# Patient Record
Sex: MALE | Race: WHITE | NOT HISPANIC OR LATINO | Employment: PART TIME | URBAN - METROPOLITAN AREA
[De-identification: names, ages, dates, MRNs, and addresses within clinical notes are randomized per-mention and may not be internally consistent; named-entity substitution may affect disease eponyms.]

---

## 2017-02-06 ENCOUNTER — RECORDS - HEALTHEAST (OUTPATIENT)
Dept: GENERAL RADIOLOGY | Facility: CLINIC | Age: 47
End: 2017-02-06

## 2017-02-06 ENCOUNTER — OFFICE VISIT - HEALTHEAST (OUTPATIENT)
Dept: FAMILY MEDICINE | Facility: CLINIC | Age: 47
End: 2017-02-06

## 2017-02-06 DIAGNOSIS — M79.642 PAIN IN LEFT HAND: ICD-10-CM

## 2017-02-06 DIAGNOSIS — M79.642 LEFT HAND PAIN: ICD-10-CM

## 2018-01-08 ENCOUNTER — OFFICE VISIT - HEALTHEAST (OUTPATIENT)
Dept: FAMILY MEDICINE | Facility: CLINIC | Age: 48
End: 2018-01-08

## 2018-01-08 DIAGNOSIS — I10 HYPERTENSION: ICD-10-CM

## 2018-01-08 DIAGNOSIS — L03.90 CELLULITIS: ICD-10-CM

## 2018-01-08 ASSESSMENT — MIFFLIN-ST. JEOR: SCORE: 2051.94

## 2018-03-15 ENCOUNTER — OFFICE VISIT - HEALTHEAST (OUTPATIENT)
Dept: FAMILY MEDICINE | Facility: CLINIC | Age: 48
End: 2018-03-15

## 2018-03-15 DIAGNOSIS — L03.90 CELLULITIS: ICD-10-CM

## 2018-03-15 ASSESSMENT — MIFFLIN-ST. JEOR: SCORE: 2074.06

## 2018-03-18 ENCOUNTER — COMMUNICATION - HEALTHEAST (OUTPATIENT)
Dept: SCHEDULING | Facility: CLINIC | Age: 48
End: 2018-03-18

## 2018-03-18 DIAGNOSIS — L03.90 CELLULITIS: ICD-10-CM

## 2018-03-27 ENCOUNTER — OFFICE VISIT - HEALTHEAST (OUTPATIENT)
Dept: FAMILY MEDICINE | Facility: CLINIC | Age: 48
End: 2018-03-27

## 2018-03-27 DIAGNOSIS — L03.211 CELLULITIS OF FACE: ICD-10-CM

## 2018-03-27 DIAGNOSIS — L03.90 CELLULITIS: ICD-10-CM

## 2018-03-27 ASSESSMENT — MIFFLIN-ST. JEOR: SCORE: 2059.88

## 2018-04-09 ENCOUNTER — RECORDS - HEALTHEAST (OUTPATIENT)
Dept: ADMINISTRATIVE | Facility: OTHER | Age: 48
End: 2018-04-09

## 2019-03-25 ENCOUNTER — HOSPITAL ENCOUNTER (OUTPATIENT)
Dept: CT IMAGING | Facility: CLINIC | Age: 49
Discharge: HOME | End: 2019-03-25
Attending: FAMILY MEDICINE

## 2019-03-25 ENCOUNTER — COMMUNICATION - HEALTHEAST (OUTPATIENT)
Dept: FAMILY MEDICINE | Facility: CLINIC | Age: 49
End: 2019-03-25

## 2019-03-25 ENCOUNTER — OFFICE VISIT - HEALTHEAST (OUTPATIENT)
Dept: FAMILY MEDICINE | Facility: CLINIC | Age: 49
End: 2019-03-25

## 2019-03-25 DIAGNOSIS — R10.31 RLQ ABDOMINAL PAIN: ICD-10-CM

## 2019-03-25 ASSESSMENT — MIFFLIN-ST. JEOR: SCORE: 2049.67

## 2019-04-08 ENCOUNTER — OFFICE VISIT - HEALTHEAST (OUTPATIENT)
Dept: FAMILY MEDICINE | Facility: CLINIC | Age: 49
End: 2019-04-08

## 2019-04-08 DIAGNOSIS — K62.5 BRBPR (BRIGHT RED BLOOD PER RECTUM): ICD-10-CM

## 2019-04-08 LAB
ERYTHROCYTE [DISTWIDTH] IN BLOOD BY AUTOMATED COUNT: 11.7 % (ref 11–14.5)
HCT VFR BLD AUTO: 45.2 % (ref 40–54)
HGB BLD-MCNC: 15.3 G/DL (ref 14–18)
MCH RBC QN AUTO: 32.7 PG (ref 27–34)
MCHC RBC AUTO-ENTMCNC: 33.8 G/DL (ref 32–36)
MCV RBC AUTO: 97 FL (ref 80–100)
PLATELET # BLD AUTO: 231 THOU/UL (ref 140–440)
PMV BLD AUTO: 9.2 FL (ref 7–10)
RBC # BLD AUTO: 4.68 MILL/UL (ref 4.4–6.2)
WBC: 6.8 THOU/UL (ref 4–11)

## 2019-04-12 ENCOUNTER — RECORDS - HEALTHEAST (OUTPATIENT)
Dept: ADMINISTRATIVE | Facility: OTHER | Age: 49
End: 2019-04-12

## 2019-12-12 ENCOUNTER — RECORDS - HEALTHEAST (OUTPATIENT)
Dept: GENERAL RADIOLOGY | Facility: CLINIC | Age: 49
End: 2019-12-12

## 2019-12-12 ENCOUNTER — OFFICE VISIT - HEALTHEAST (OUTPATIENT)
Dept: FAMILY MEDICINE | Facility: CLINIC | Age: 49
End: 2019-12-12

## 2019-12-12 DIAGNOSIS — R50.9 FEVER, UNSPECIFIED: ICD-10-CM

## 2019-12-12 DIAGNOSIS — R50.9 FEVER: ICD-10-CM

## 2019-12-12 DIAGNOSIS — R05.9 COUGH: ICD-10-CM

## 2019-12-12 DIAGNOSIS — J20.9 ACUTE BRONCHITIS, UNSPECIFIED ORGANISM: ICD-10-CM

## 2019-12-12 LAB
FLUAV AG SPEC QL IA: NORMAL
FLUBV AG SPEC QL IA: NORMAL

## 2020-01-29 ENCOUNTER — OFFICE VISIT - HEALTHEAST (OUTPATIENT)
Dept: FAMILY MEDICINE | Facility: CLINIC | Age: 50
End: 2020-01-29

## 2020-01-29 ENCOUNTER — RECORDS - HEALTHEAST (OUTPATIENT)
Dept: GENERAL RADIOLOGY | Facility: CLINIC | Age: 50
End: 2020-01-29

## 2020-01-29 DIAGNOSIS — R68.89 FLU-LIKE SYMPTOMS: ICD-10-CM

## 2020-01-29 DIAGNOSIS — R05.9 COUGH: ICD-10-CM

## 2020-01-29 DIAGNOSIS — J10.1 INFLUENZA B: ICD-10-CM

## 2020-01-29 LAB
FLUAV AG SPEC QL IA: ABNORMAL
FLUBV AG SPEC QL IA: ABNORMAL

## 2020-03-24 ENCOUNTER — VIRTUAL VISIT (OUTPATIENT)
Dept: FAMILY MEDICINE | Facility: OTHER | Age: 50
End: 2020-03-24

## 2020-03-24 NOTE — PROGRESS NOTES
"Date: 2020 18:25:50  Clinician: Macy Marks  Clinician NPI: [id number]  Patient: Ben Zarate  Patient : 1970  Patient Address: [address] [place] [address]  Patient Phone: [phone]  Visit Protocol: URI  Patient Summary:  Ben is a 49 year old ( : 1970 ) male who initiated a Visit for cold, sinus infection, or influenza. When asked the question \"Please sign me up to receive news, health information and promotions from [employer].\", Ben responded \"Yes\".    Ben states his symptoms started 1-2 days ago.   His symptoms consist of myalgia, a cough, malaise, chills, and a headache. He is experiencing mild difficulty breathing with activities but can speak normally in full sentences. Ben also feels feverish.   Symptom details     Cough: Ben coughs every 5-10 minutes and his cough is not more bothersome at night. Phlegm does not come into his throat when he coughs. He does not believe his cough is caused by post-nasal drip.     Temperature: His current temperature is 99.9 degrees Fahrenheit.     Headache: He states the headache is mild (1-3 on a 10 point pain scale).      Ben denies having ear pain, rhinitis, enlarged lymph nodes, facial pain or pressure, wheezing, sore throat, nasal congestion, and teeth pain. He also denies having a sinus infection within the past year, taking antibiotic medication for the symptoms, and having recent facial or sinus surgery in the past 60 days.   Precipitating events  He has not recently been exposed to someone with influenza. Ben has not been in close contact with any high risk individuals.   Pertinent COVID-19 (Coronavirus) information  Ben has not traveled internationally or to the areas where COVID-19 (Coronavirus) is widespread, including cruise ship travel in the last 14 days before the start of his symptoms.   Ben has not had a close contact with a laboratory-confirmed COVID-19 patient within 14 days of symptom onset. He also has not " had a close contact with a suspected COVID-19 patient within 14 days of symptom onset.   Ben is not a healthcare worker and does not work in a healthcare facility.   Pertinent medical history  Ben does not need a return to work/school note.   Weight: 270 lbs   Ben does not smoke or use smokeless tobacco.   Weight: 270 lbs    MEDICATIONS: omeprazole oral, ALLERGIES: NKDA  Clinician Response:  Dear Ben,  I am sorry you are not feeling well. Your health is our priority. Based on the information you have provided, it is possible that you may have some type of viral infection.  Please read the full treatment plan and see my recommendations below.  Medication information  Because you have a viral infection, antibiotics will not help you get better. Treating a viral infection with antibiotics could actually make you feel worse.  Unless you are allergic to the over-the-counter medication(s) below, I recommend using:     Acetaminophen (Tylenol or store brand) oral tablet. Take 1-2 tablets by mouth every 4-6 hours to help with the discomfort.   Over-the-counter medications do not require a prescription. Ask the pharmacist if you have any questions.  Self care  Steps you can take to be as comfortable as possible:     Rest.    Drink plenty of water and other liquids.    Take a spoonful of honey to reduce your cough.     Additional treatment plan   Based on the information you have provided, you do have symptoms that are consistent with Coronavirus (COVID-19).  The coronavirus causes mild to severe respiratory illness with the most common symptoms including fever, cough and difficulty breathing. Unfortunately, many viruses cause similar symptoms and it can be difficult to distinguish between viruses, especially in mild cases, so we are presuming that anyone with cough or fever has coronavirus at this time.  Coronavirus/COVID-19 has reached the point of community spread in [place], meaning that we are finding the virus in  people with no known exposure risk for mylene the virus. Given the increasing commonness of coronavirus in the community we are no longer testing patients who are not critically ill.  If you are a health care worker, you should refer to your employee health office for instructions about testing and returning to work.  For everyone else who has cough or fever, you should assume you are infected with coronavirus. Since you will not be tested but have symptoms that may be consistent with coronavirus, the CDC recommends you stay in self-isolation until these three things have happened:    You have had no fever for at least 72 hours (that is three full days of no fever without the use of medicine that reduces fevers)    AND   Other symptoms have improved (for example, when your cough or shortness of breath have improved)   AND   At least 7 days have passed since your symptoms first appeared.   How to Isolate:   Isolate yourself at home.  Do Not allow any visitors  Do Not go to work or school  Do Not go to [employer],  centers, shopping, or other public places.  Do Not shake hands.  Avoid close contact with others (hugging, kissing).   Protect Others:   Cover Your Mouth and Nose with a mask, disposable tissue or wash cloth to avoid spreading germs to others.  Wash your hands and face frequently with soap and water.   We know it can be scary to hear that you might have COVID-19. Our team can help track your symptoms and make sure you are doing ok over the next two weeks using a program called [employer] to keep in touch. When you receive an email from [employer], please consider enrolling in our monitoring program. There is no cost to you for monitoring. Here is a URL where you can learn more: [email or web address]  Managing Symptoms:   At this time, we primarily recommend Tylenol (Acetaminophen) for fever or pain. If you have liver or kidney problems, contact your primary care provider for  instructions on use of tylenol. Adults can take 650 mg (two 325 mg pills) by mouth every 4-6 hours as needed OR 1,000 mg (two 500 mg pills) every 8 hours as needed. MAXIMUM DAILY DOSE: 3,000mg. For children, refer to dosing on bottle based on age or weight.   If you develop significant shortness of breath that prevents you from doing normal activities, please call 911 or proceed to the nearest emergency room and alert them immediately that you have been in self-isolation for possible coronavirus.  For more information about COVID19 and options for caring for yourself at home, please visit the CDC website at https://www.cdc.gov/coronavirus/2019-ncov/about/steps-when-sick.htmlFor more options for care at [facility], please visit our website at [email or web address]    Diagnosis: Coronavirus infection, unspecified  Diagnosis ICD: B34.2

## 2020-03-27 ENCOUNTER — COMMUNICATION - HEALTHEAST (OUTPATIENT)
Dept: SCHEDULING | Facility: CLINIC | Age: 50
End: 2020-03-27

## 2020-03-27 ENCOUNTER — OFFICE VISIT - HEALTHEAST (OUTPATIENT)
Dept: FAMILY MEDICINE | Facility: CLINIC | Age: 50
End: 2020-03-27

## 2020-03-27 ENCOUNTER — VIRTUAL VISIT (OUTPATIENT)
Dept: FAMILY MEDICINE | Facility: OTHER | Age: 50
End: 2020-03-27

## 2020-03-27 DIAGNOSIS — R05.9 COUGH: ICD-10-CM

## 2020-03-27 DIAGNOSIS — Z20.822 SUSPECTED COVID-19 VIRUS INFECTION: ICD-10-CM

## 2020-03-27 NOTE — PROGRESS NOTES
"Date: 2020 08:47:04  Clinician: Dolly Moreno  Clinician NPI: [id number]  Patient: eBn Zarate  Patient : 1970  Patient Address: [address] [place] [address]  Patient Phone: [phone]  Visit Protocol: URI  Patient Summary:  Ben is a 49 year old ( : 1970 ) male who initiated a Visit for COVID-19 (Coronavirus) evaluation and screening. When asked the question \"Please sign me up to receive news, health information and promotions from [employer].\", Ben responded \"No\".    Ben states his symptoms started suddenly 3-6 days ago. After his symptoms started, they improved and then got worse again.   His symptoms consist of a cough, malaise, a headache, and myalgia.   Symptom details     Cough: Ben coughs every 5-10 minutes and his cough is not more bothersome at night. Phlegm does not come into his throat when he coughs. He does not believe his cough is caused by post-nasal drip.     Headache: He states the headache is mild (1-3 on a 10 point pain scale).      Ben denies having ear pain, rhinitis, wheezing, sore throat, nasal congestion, enlarged lymph nodes, teeth pain, fever, facial pain or pressure, and chills. He also denies taking antibiotic medication for the symptoms, having recent facial or sinus surgery in the past 60 days, and having a sinus infection within the past year.   Precipitating events  He has not recently been exposed to someone with influenza. Ben has not been in close contact with any high risk individuals.   Pertinent COVID-19 (Coronavirus) information  Ben has not traveled internationally or to the areas where COVID-19 (Coronavirus) is widespread, including cruise ship travel in the last 14 days before the start of his symptoms.   Ben has not had a close contact with a laboratory-confirmed COVID-19 patient within 14 days of symptom onset. He also has not had a close contact with a suspected COVID-19 patient within 14 days of symptom onset.   Ben is not a " healthcare worker or a  and does not work in a healthcare facility. He does not live with a healthcare worker.   Triage Point(s) temporarily suspended for COVID-19 (Coronavirus) screening  Ben reported the following symptoms which were previously protocol referral points. These protocol referral points have temporarily been removed for purposes of COVID-19 (Coronavirus) screening.   Difficulty breathing even when resting and can only speak in phrase(s)   Pertinent medical history  Ben does not need a return to work/school note.   Weight: 270 lbs   Ben does not smoke or use smokeless tobacco.   Additional information as reported by the patient (free text): I can speak in full sentences, but am having trouble breathing even while resting. It is getting worse each day. That wasn't an option to pick.   Weight: 270 lbs    MEDICATIONS: omeprazole oral, ALLERGIES: NKDA  Clinician Response:  Dear Ben,   Based on the information you have provided, you do have symptoms that are consistent with Coronavirus (COVID-19).  The coronavirus causes mild to severe respiratory illness with the most common symptoms including fever, cough and difficulty breathing. Unfortunately, many viruses cause similar symptoms and it can be difficult to distinguish between viruses, especially in mild cases, so we are presuming that anyone with cough or fever has coronavirus at this time.  Coronavirus/COVID-19 has reached the point of community spread in [place], meaning that we are finding the virus in people with no known exposure risk for mylene the virus. Given the increasing commonness of coronavirus in the community we are no longer testing patients who are not critically ill.  If you are a health care worker, you should refer to your employee health office for instructions about testing and returning to work.  For everyone else who has cough or fever, you should assume you are infected with coronavirus. Since you will  not be tested but have symptoms that may be consistent with coronavirus, the CDC recommends you stay in self-isolation until these three things have happened:    You have had no fever for at least 72 hours (that is three full days of no fever without the use of medicine that reduces fevers)    AND   Other symptoms have improved (for example, when your cough or shortness of breath have improved)   AND   At least 7 days have passed since your symptoms first appeared.   How to Isolate:   Isolate yourself at home.  Do Not allow any visitors  Do Not go to work or school  Do Not go to [employer]'s witness,  centers, shopping, or other public places.  Do Not shake hands.  Avoid close contact with others (hugging, kissing).   Protect Others:   Cover Your Mouth and Nose with a mask, disposable tissue or wash cloth to avoid spreading germs to others.  Wash your hands and face frequently with soap and water.   We know it can be scary to hear that you might have COVID-19. Our team can help track your symptoms and make sure you are doing ok over the next two weeks using a program called [employer] to keep in touch. When you receive an email from [employer], please consider enrolling in our monitoring program. There is no cost to you for monitoring. Here is a URL where you can learn more: [email or web address]  Managing Symptoms:   At this time, we primarily recommend Tylenol (Acetaminophen) for fever or pain. If you have liver or kidney problems, contact your primary care provider for instructions on use of tylenol. Adults can take 650 mg (two 325 mg pills) by mouth every 4-6 hours as needed OR 1,000 mg (two 500 mg pills) every 8 hours as needed. MAXIMUM DAILY DOSE: 3,000mg. For children, refer to dosing on bottle based on age or weight.   If you develop significant shortness of breath that prevents you from doing normal activities, please call 911 or proceed to the nearest emergency room and alert them immediately that  you have been in self-isolation for possible coronavirus.  If you have a higher risk medical condition such as cancer, heart failure, end stage renal disease on dialysis or have a transplant, please reach out to your specialist's clinic to advise them of your [facility] visit should you not improve within the next two days.   For more information about COVID19 and options for caring for yourself at home, please visit the CDC website at https://www.cdc.gov/coronavirus/2019-ncov/about/steps-when-sick.htmlFor more options for care at [facility], please visit our website at [email or web address]    Diagnosis: Cough  Diagnosis ICD: R05

## 2020-04-13 ENCOUNTER — COMMUNICATION - HEALTHEAST (OUTPATIENT)
Dept: FAMILY MEDICINE | Facility: CLINIC | Age: 50
End: 2020-04-13

## 2020-04-13 DIAGNOSIS — R05.9 COUGH: ICD-10-CM

## 2020-07-12 DIAGNOSIS — Z11.59 SCREENING FOR VIRAL DISEASE: ICD-10-CM

## 2020-11-05 ENCOUNTER — VIRTUAL VISIT (OUTPATIENT)
Dept: FAMILY MEDICINE | Facility: OTHER | Age: 50
End: 2020-11-05

## 2020-11-05 NOTE — PROGRESS NOTES
"Date: 2020 08:34:08  Clinician: John Sky  Clinician NPI: [id number]  Patient: Ben Zarate  Patient : 1970  Patient Address: [address] [place] [address]  Patient Phone: [phone]  Visit Protocol: URI  Patient Summary:  Ben is a 50 year old ( : 1970 ) male who initiated a [facility] Visit for COVID-19 (Coronavirus) evaluation and screening. When asked the question \"Please sign me up to receive news, health information and promotions. \", Ben responded \"No\".    Ben states his symptoms started 1-2 days ago.   His symptoms consist of rhinitis, myalgia, chills, malaise, a sore throat, a headache, and a cough. Ben also feels feverish.   Symptom details     Nasal secretions: The color of his mucus is clear.    Cough: Ben coughs a few times an hour and his cough is not more bothersome at night. Phlegm does not come into his throat when he coughs. He believes his cough is caused by post-nasal drip.     Sore throat: Ben reports having mild throat pain (1-3 on a 10 point pain scale), does not have exudate on his tonsils, and can swallow liquids. The lymph nodes in his neck are not enlarged. A rash has not appeared on the skin since the sore throat started.     Temperature: His current temperature is 101.0 degrees Fahrenheit. Ben has had a temperature over 100 degrees Fahrenheit for 1-2 days.     Headache: He states the headache is mild (1-3 on a 10 point pain scale).      Ben denies having vomiting, facial pain or pressure, teeth pain, ageusia, diarrhea, ear pain, wheezing, enlarged lymph nodes, nasal congestion, nausea, and anosmia. He also denies taking antibiotic medication in the past month, having recent facial or sinus surgery in the past 60 days, and having a sinus infection within the past year. He is not experiencing dyspnea.   Precipitating events  Within the past week, Ben has not been exposed to someone with strep throat. He has not recently been exposed to someone " with influenza. Ben has not been in close contact with any high risk individuals.   Pertinent COVID-19 (Coronavirus) information  Ben does not work or volunteer as healthcare worker or a . In the past 14 days, Ben has not worked or volunteered at a healthcare facility or group living setting.   In the past 14 days, he also has not lived in a congregate living setting.   Ben has not had a close contact with a laboratory-confirmed COVID-19 patient within 14 days of symptom onset.    Since December 2019, Ben has not been tested for COVID-19 and has not had upper respiratory infection or influenza-like illness.   Pertinent medical history  Ben needs a return to work/school note.   Weight: 275 lbs   Ben does not smoke or use smokeless tobacco.   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 I have to have a negative test result to go back to work   Weight: 275 lbs    MEDICATIONS: omeprazole oral, ALLERGIES: NKDA  Clinician Response:  Dear Ben,   Your symptoms show that you may have coronavirus (COVID-19). This illness can cause fever, cough and trouble breathing. Many people get a mild case and get better on their own. Some people can get very sick.  What should I do?  We would like to test you for this virus.   1. Please call [phone] to schedule your visit. Explain that [facility]. Be ready to share your [facility] visit ID number.  * If you need to schedule in [facility] please call [phone] or for [place] employees please call [phone].  * If you need to schedule in the [place] area please call [phone]. [place] employees call [phone].  The following will serve as your written order for this COVID Test, ordered by me, for the indication of suspected COVID [Z20.828]: The test will be ordered in [employer], our electronic health record, after you are scheduled. It will show as ordered and  "authorized by Surya Healy MD.  Order: COVID-19 (Coronavirus) PCR for SYMPTOMATIC testing from [facility].   2. When it's time for your COVID test:  Stay at least 6 feet away from others. (If someone will drive you to your test, stay in the backseat, as far away from the  as you can.)   Cover your mouth and nose with a mask, tissue or washcloth.  Go straight to the testing site. Don't make any stops on the way there or back.      3.Starting now: Stay home and away from others (self-isolate) until:   You've had no fever---and no medicine that reduces fever---for one full day (24 hours). And...   Your other symptoms have gotten better. For example, your cough or breathing has improved. And...   At least 10 days have passed since your symptoms started.       During this time, don't leave the house except for testing or medical care.   Stay in your own room, even for meals. Use your own bathroom if you can.   Stay away from others in your home. No hugging, kissing or shaking hands. No visitors.  Don't go to work, school or anywhere else.    Clean \"high touch\" surfaces often (doorknobs, counters, handles, etc.). Use a household cleaning spray or wipes. You'll find a full list of  on the EPA website: www.epa.gov/pesticide-registration/list-n-disinfectants-use-against-sars-cov-2.   Cover your mouth and nose with a mask, tissue or washcloth to avoid spreading germs.  Wash your hands and face often. Use soap and water.  Caregivers in these groups are at risk for severe illness due to COVID-19:  o People 65 years and older  o People who live in a nursing home or long-term care facility  o People with chronic disease (lung, heart, cancer, diabetes, kidney, liver, immunologic)  o People who have a weakened immune system, including those who:   Are in cancer treatment  Take medicine that weakens the immune system, such as corticosteroids  Had a bone marrow or organ transplant  Have an immune deficiency  Have poorly " controlled HIV or AIDS  Are obese (body mass index of 40 or higher)  Smoke regularly   o Caregivers should wear gloves while washing dishes, handling laundry and cleaning bedrooms and bathrooms.  o Use caution when washing and drying laundry: Don't shake dirty laundry, and use the warmest water setting that you can.  o For more tips, go to www.cdc.gov/coronavirus/2019-ncov/downloads/10Things.pdf.    How can I take care of myself?    Get lots of rest. Drink extra fluids (unless a doctor has told you not to).   Take Tylenol (acetaminophen) for fever or pain. If you have liver or kidney problems, ask your family doctor if it's okay to take Tylenol.   Adults can take either:    650 mg (two 325 mg pills) every 4 to 6 hours, or...   1,000 mg (two 500 mg pills) every 8 hours as needed.    Note: Don't take more than 3,000 mg in one day. Acetaminophen is found in many medicines (both prescribed and over-the-counter medicines). Read all labels to be sure you don't take too much.   For children, check the Tylenol bottle for the right dose. The dose is based on the child's age or weight.    If you have other health problems (like cancer, heart failure, an organ transplant or severe kidney disease): Call your specialty clinic if you don't feel better in the next 2 days.       Know when to call 911. Emergency warning signs include:    Trouble breathing or shortness of breath Pain or pressure in the chest that doesn't go away Feeling confused like you haven't felt before, or not being able to wake up Bluish-colored lips or face.  Where can I get more information?    [employer] -- About COVID-19: [email or web address]   CDC -- What to Do If You're Sick: www.cdc.gov/coronavirus/2019-ncov/about/steps-when-sick.html   CDC -- Ending Home Isolation: www.cdc.gov/coronavirus/2019-ncov/hcp/disposition-in-home-patients.html   CDC -- Caring for Someone: www.cdc.gov/coronavirus/2019-ncov/if-you-are-sick/care-for-someone.html    [facility] -- Interim Guidance for Hospital Discharge to Home: www.health.[facility].[place]/diseases/coronavirus/hcp/hospdischarge.pdf   [facility] clinical trials (COVID-19 research studies): clinicalaffairs.[facility] [place]/n-clinical-trials    Below are the COVID-19 hotlines at the [place] Department of Health ([facility]). Interpreters are available.    For health questions: Call 123-023-2738 or 1-796.400.9708 (7 a.m. to 7 p.m.) For questions about schools and childcare: Call 854-030-7122 or 1-697.860.7389 (7 a.m. to 7 p.m.)    Diagnosis: Contact with and (suspected) exposure to other viral communicable diseases  Diagnosis ICD: Z20.828

## 2020-11-08 ENCOUNTER — AMBULATORY - HEALTHEAST (OUTPATIENT)
Dept: FAMILY MEDICINE | Facility: CLINIC | Age: 50
End: 2020-11-08

## 2020-11-08 DIAGNOSIS — Z20.822 SUSPECTED COVID-19 VIRUS INFECTION: ICD-10-CM

## 2020-11-09 ENCOUNTER — AMBULATORY - HEALTHEAST (OUTPATIENT)
Dept: FAMILY MEDICINE | Facility: CLINIC | Age: 50
End: 2020-11-09

## 2020-11-09 DIAGNOSIS — Z20.822 SUSPECTED COVID-19 VIRUS INFECTION: ICD-10-CM

## 2021-05-27 NOTE — TELEPHONE ENCOUNTER
Test Results  Who is calling?:Patient    Who ordered the test:  PCP  Type of test: Imaging CT Abd pelvic    Date of test: 3/25/19  Where was the test performed:  [facility]  What are your questions/concerns?:  Please call when results are available  No final at this time.   Okay to leave a detailed message?:  No

## 2021-05-27 NOTE — PROGRESS NOTES
"Chief Complaint   Patient presents with     Abdominal Pain     cramping, nausea vomiting, diarrhea  comes and goes since last monday, no fever,        HPI: 48-year-old male presents today with one-week history of abdominal cramps, lower abdominal pain, occasional diarrhea and one episode of emesis.  He notes that last Monday he started having abdominal cramps, and on Tuesday he has had diarrhea.  He had one episode of emesis but mostly intermittent diarrhea.  There is been no melena or hematochezia.  Been taking Imodium for decreased diarrhea.  No fever or chills noted.    ROS: No fever chills melena hematochezia    SH:    reports that  has never smoked. he has never used smokeless tobacco. He reports that he drinks alcohol. He reports that he does not use drugs.      FH: The Patient's family history is not on file.     Meds:  Ben has a current medication list which includes the following prescription(s): omeprazole, acetaminophen, hydrochlorothiazide, and ibuprofen.    O:  /82   Pulse 94   Temp 98.7  F (37.1  C)   Ht 5' 10\" (1.778 m)   Wt (!) 262 lb (118.8 kg)   SpO2 96%   BMI 37.59 kg/m       Lungs--Clear to Auscultation  Heart--Regular rate and rhythm  Abdomen--mildly tender with increased pain in the right lower quadrant  Skin-Pink and dry     A/P:   1. RLQ abdominal pain  The patient has right lower quadrant pain with diarrhea for the past week.  Most likely a viral cause however cannot rule out other causes of abdominal pain including appendicitis, Meckel's diverticulum and colitis.  Will check laboratory, and sent immediately over to [facility] for CT scan.  - HM2(CBC w/o Differential)  - Comprehensive Metabolic Panel  - CT Chest Abdomen Pelvis With Oral With IV Cont; Future                                          "

## 2021-05-27 NOTE — PROGRESS NOTES
Chief Complaint   Patient presents with     Diarrhea     Pt c/o RLQ cramping this morning. Blood in diarrhea this moring.        HPI: Ben was seen urgently today as a walk-in due to lower abdominal pain.  He has had cramping and lower abdominal pain since 630 this morning.  This is accompanied by one episode of relatively dark blood.  The cramping is intermittent, in the lower portion of the abdomen, and not associated with fever chills nausea vomiting.  Mild diarrhea was noted.    Review of old records from 3/25/2019 shows CT scan of the abdomen had no pathology.    ROS: No fever chills vomiting.    SH:    reports that he has never smoked. He has never used smokeless tobacco. He reports that he drinks alcohol. He reports that he does not use drugs.      FH: The Patient's family history is not on file.     Meds:  Ben has a current medication list which includes the following prescription(s): acetaminophen, ibuprofen, omeprazole, and hydrochlorothiazide.    O:  /90   Pulse 97   Temp 98.3  F (36.8  C)   Resp 18   Wt (!) 266 lb 5 oz (120.8 kg)   SpO2 98%   BMI 38.21 kg/m    No acute distress  Lungs--Clear to Auscultation  Heart--Regular rate and rhythm  Abdomen--Soft, non-tender, non-distended  Skin-Pink and dry     A/P:   1. BRBPR (bright red blood per rectum)  The patient has one episode of blood per rectum with abdominal cramping.  This is in the context of having had this 2 weeks ago.  We will have him see GI promptly in the next 1 or 2 days, and recheck hemoglobin  - Ambulatory referral for Colonoscopy    2.  Abdominal pain  -Encourage clear liquid diet for 24 hours, and stay home from work today.  Note was given for work.

## 2021-05-30 VITALS — BODY MASS INDEX: 36.66 KG/M2 | WEIGHT: 255.5 LBS

## 2021-05-31 VITALS — HEIGHT: 70 IN | BODY MASS INDEX: 37.58 KG/M2 | WEIGHT: 262.5 LBS

## 2021-06-01 VITALS — BODY MASS INDEX: 37.83 KG/M2 | HEIGHT: 70 IN | WEIGHT: 264.25 LBS

## 2021-06-01 VITALS — BODY MASS INDEX: 38.28 KG/M2 | WEIGHT: 267.38 LBS | HEIGHT: 70 IN

## 2021-06-02 VITALS — WEIGHT: 266.31 LBS | BODY MASS INDEX: 38.21 KG/M2

## 2021-06-02 VITALS — WEIGHT: 262 LBS | BODY MASS INDEX: 37.51 KG/M2 | HEIGHT: 70 IN

## 2021-06-04 VITALS
SYSTOLIC BLOOD PRESSURE: 133 MMHG | WEIGHT: 269 LBS | BODY MASS INDEX: 38.6 KG/M2 | OXYGEN SATURATION: 97 % | DIASTOLIC BLOOD PRESSURE: 89 MMHG | TEMPERATURE: 98.3 F | RESPIRATION RATE: 16 BRPM | HEART RATE: 83 BPM

## 2021-06-04 VITALS
BODY MASS INDEX: 38.98 KG/M2 | OXYGEN SATURATION: 98 % | WEIGHT: 271.7 LBS | RESPIRATION RATE: 26 BRPM | TEMPERATURE: 98.9 F | SYSTOLIC BLOOD PRESSURE: 136 MMHG | DIASTOLIC BLOOD PRESSURE: 80 MMHG | HEART RATE: 110 BPM

## 2021-06-04 NOTE — PROGRESS NOTES
Assessment:     1. Acute bronchitis, unspecified organism     2. Cough  Influenza A/B Rapid Test- Nasal Swab    XR Chest 2 Views    benzonatate (TESSALON) 200 MG capsule   3. Fever  Influenza A/B Rapid Test- Nasal Swab    XR Chest 2 Views          Plan:     Symptoms consistent with acute viral bronchitis.  No antibiotics indicated at this time.  Chest x-ray ordered and reviewed by myself showing no evidence of infiltrate.  Previously seen granuloma is unchanged.  Negative for influenza.  Prescription given for Tessalon Perles to take symptomatically for his cough.  Discussed the typical course of illness.  Recommend following up in 1 week if developing high fevers or significantly worsening shortness of breath or other cough symptoms.  Patient is agreeable with this plan.    Subjective:       49 y.o. male presents for evaluation of cough that seems to be getting worse.  2 weeks ago he started having some nasal congestion and cough as well as a low-grade fever.  The symptoms seem to be improving after about a week but over the past 4 to 5 days has had body aches, chills, and worsening cough productive of yellowish-brown sputum.  He denies much nasal congestion and has not really had a sore throat or fever.  He has used guaifenesin cough syrup for his symptoms which has seemed to be mildly helpful.  He denies tobacco use or vaping.  No history of asthma.  He has not been short of breath or wheezy.    Patient Active Problem List   Diagnosis     Morbid obesity (H)     Essential hypertension       Past Medical History:   Diagnosis Date     GERD (gastroesophageal reflux disease)      Hiatal hernia        Past Surgical History:   Procedure Laterality Date     HERNIA REPAIR         Current Outpatient Medications on File Prior to Visit   Medication Sig Dispense Refill     ibuprofen (ADVIL,MOTRIN) 200 MG tablet Take 200 mg by mouth every 6 (six) hours as needed for pain.       MULTIVITAMIN ORAL Take 1 tablet by mouth daily.        omeprazole (PRILOSEC) 20 MG capsule Take 20 mg by mouth daily.       acetaminophen (TYLENOL) 500 MG tablet Take 1 tablet (500 mg total) by mouth every 6 (six) hours as needed.  0     hydroCHLOROthiazide (MICROZIDE) 12.5 mg capsule Take 2 capsules (25 mg total) by mouth daily. 90 capsule 0     No current facility-administered medications on file prior to visit.        No Known Allergies    No family history on file.    Social History     Socioeconomic History     Marital status:      Spouse name: None     Number of children: None     Years of education: None     Highest education level: None   Occupational History     None   Social Needs     Financial resource strain: None     Food insecurity:     Worry: None     Inability: None     Transportation needs:     Medical: None     Non-medical: None   Tobacco Use     Smoking status: Never Smoker     Smokeless tobacco: Never Used     Tobacco comment: no vaping   Substance and Sexual Activity     Alcohol use: Yes     Comment: rare     Drug use: No     Sexual activity: None   Lifestyle     Physical activity:     Days per week: None     Minutes per session: None     Stress: None   Relationships     Social connections:     Talks on phone: None     Gets together: None     Attends [employer] service: None     Active member of club or organization: None     Attends meetings of clubs or organizations: None     Relationship status: None     Intimate partner violence:     Fear of current or ex partner: None     Emotionally abused: None     Physically abused: None     Forced sexual activity: None   Other Topics Concern     None   Social History Narrative     None         Review of Systems  A 12 point comprehensive review of systems was negative except as noted.      Objective:      /80 (Patient Site: Right Arm, Patient Position: Sitting, Cuff Size: Adult Regular)   Pulse (!) 110   Temp 98.9  F (37.2  C) (Oral)   Resp 26   Wt (!) 271 lb 11.2 oz (123.2 kg)   SpO2  98%   BMI 38.98 kg/m      General Appearance:    Alert, mildly ill-appearing but in no distress.   Head:    Normocephalic, without obvious abnormality, atraumatic   Eyes:    Conjunctiva/corneas clear   Ears:    Normal TM's without erythema or bulging. Jennifer external ear canals, both ears   Nose:   Nares normal, septum midline, mucosa normal, no drainage    or sinus tenderness   Throat:   Lips, mucosa, and tongue normal; teeth and gums normal.  No tonsilar hypertrophy or exudate.   Neck:   Supple, symmetrical, trachea midline, no adenopathy    Lungs:    A few rhonchi heard in the right lower lung field.  No wheezing.  Overall good aeration.    Heart:    Regular rate and rhythm, S1 and S2 normal, no murmur, rub   or gallop       Extremities:   Extremities normal, atraumatic, no cyanosis or edema   Skin:   Skin color, texture, turgor normal, no rashes or lesions          Recent Results (from the past 24 hour(s))   Influenza A/B Rapid Test- Nasal Swab   Result Value Ref Range    Influenza  A, Rapid Antigen No Influenza A antigen detected No Influenza A antigen detected    Influenza B, Rapid Antigen No Influenza B antigen detected No Influenza B antigen detected         Xr Chest 2 Views    Result Date: 12/12/2019  EXAM: XR CHEST 2 VIEWS LOCATION: [facility] DATE/TIME: 12/12/2019 8:06 AM INDICATION: Cough COMPARISON: 02/14/2017 and CT 03/25/2019     Benign calcified granuloma right midlung unchanged. Lungs otherwise clear with no focal infiltrates.           This note has been dictated using voice recognition software. Any grammatical or context distortions are unintentional and inherent to the software

## 2021-06-05 NOTE — PROGRESS NOTES
Assessment:     1. Influenza B     2. Flu-like symptoms  Influenza A/B Rapid Test- Nasal Swab   3. Cough  XR Chest 2 Views          Plan:     Patient positive for influenza.  Tamiflu not advised given the duration of his symptoms already.  Chest x-ray ordered and reviewed by myself showing no evidence of infiltrate.  No antibiotics indicated at this time.  Discussed the typical course of symptoms and the length that patient will be contagious.  Recommend symptomatic care with Tylenol, ibuprofen, rest, and fluids.  Follow-up if symptoms getting worse or not improving in the expected time course of symptoms.        Subjective:       49 y.o. male presents for evaluation of a 3-day history of cough, fever, body aches, and mild headache.  At times he feels like he is wheezing but this is not been consistent.  He has not been short of breath.  Denies much nasal congestion or sore throat.  He has not had any ear pain.        Patient Active Problem List   Diagnosis     Morbid obesity (H)     Essential hypertension       Past Medical History:   Diagnosis Date     GERD (gastroesophageal reflux disease)      Hiatal hernia        Past Surgical History:   Procedure Laterality Date     HERNIA REPAIR         Current Outpatient Medications on File Prior to Visit   Medication Sig Dispense Refill     ibuprofen (ADVIL,MOTRIN) 200 MG tablet Take 200 mg by mouth every 6 (six) hours as needed for pain.       MULTIVITAMIN ORAL Take 1 tablet by mouth daily.       omeprazole (PRILOSEC) 20 MG capsule Take 20 mg by mouth daily.       acetaminophen (TYLENOL) 500 MG tablet Take 1 tablet (500 mg total) by mouth every 6 (six) hours as needed.  0     benzonatate (TESSALON) 200 MG capsule Take 1 capsule (200 mg total) by mouth 3 (three) times a day as needed for cough. 30 capsule 0     hydroCHLOROthiazide (MICROZIDE) 12.5 mg capsule Take 2 capsules (25 mg total) by mouth daily. 90 capsule 0     hyoscyamine (LEVSIN/SL) 0.125 mg SL tablet TAKE 1-2  TABS SUBLINGUALLY FOR ABDOMINAL CRAMPS UP TO THREE TIMES DAILY AS NEEDED  3     No current facility-administered medications on file prior to visit.        No Known Allergies    No family history on file.    Social History     Socioeconomic History     Marital status:      Spouse name: None     Number of children: None     Years of education: None     Highest education level: None   Occupational History     None   Social Needs     Financial resource strain: None     Food insecurity:     Worry: None     Inability: None     Transportation needs:     Medical: None     Non-medical: None   Tobacco Use     Smoking status: Never Smoker     Smokeless tobacco: Never Used     Tobacco comment: no vaping   Substance and Sexual Activity     Alcohol use: Yes     Comment: rare     Drug use: No     Sexual activity: None   Lifestyle     Physical activity:     Days per week: None     Minutes per session: None     Stress: None   Relationships     Social connections:     Talks on phone: None     Gets together: None     Attends Ep[employer] service: None     Active member of club or organization: None     Attends meetings of clubs or organizations: None     Relationship status: None     Intimate partner violence:     Fear of current or ex partner: None     Emotionally abused: None     Physically abused: None     Forced sexual activity: None   Other Topics Concern     None   Social History Narrative     None         Review of Systems  A 12 point comprehensive review of systems was negative except as noted.      Objective:                                  General Appearance:      Vitals:    01/29/20 0908   BP: 133/89   Pulse: 83   Resp: 16   Temp: 98.3  F (36.8  C)   SpO2: 97%           Alert, mildly ill-appearing but in no distress.   Head:    Normocephalic, without obvious abnormality, atraumatic   Eyes:    Conjunctiva/corneas clear   Ears:    Normal TM's without erythema or bulging. Normal external ear canals, both ears   Nose:    Nares normal, septum midline, mucosa normal, no drainage    or sinus tenderness   Throat:   Lips, mucosa, and tongue normal; teeth and gums normal.  No tonsilar hypertrophy or exudate.   Neck:   Supple, symmetrical, trachea midline, no adenopathy    Lungs:    Few scattered rhonchi heard at the left base.  Overall good aeration.  No wheezing.    Heart:    Regular rate and rhythm, S1 and S2 normal, no murmur, rub or gallop       Extremities:   Extremities normal, atraumatic, no cyanosis or edema   Skin:   Skin color, texture, turgor normal, no rashes or lesions     Chest x-ray ordered and reviewed by myself.  No evidence of infiltrate that I can appreciate.    Recent Results (from the past 24 hour(s))   Influenza A/B Rapid Test- Nasal Swab   Result Value Ref Range    Influenza  A, Rapid Antigen No Influenza A antigen detected No Influenza A antigen detected    Influenza B, Rapid Antigen Influenza B antigen detected (!) No Influenza B antigen detected         Xr Chest 2 Views    Result Date: 1/29/2020  EXAM: XR CHEST 2 VIEWS LOCATION: [facility] DATE/TIME: 1/29/2020 9:32 AM INDICATION: Cough COMPARISON: 12/12/2019     No focal consolidation or pleural effusion. A stable calcified granuloma in the right midlung. The cardiac silhouette and pulmonary vasculature are normal.             This note has been dictated using voice recognition software. Any grammatical or context distortions are unintentional and inherent to the software

## 2021-06-07 NOTE — TELEPHONE ENCOUNTER
Refill Approved    Rx renewed per Medication Renewal Policy. Medication was last renewed on 3/27/20.    Shonna Blair, [facility] Triage/Med Refill 4/13/2020     Requested Prescriptions   Pending Prescriptions Disp Refills     albuterol (PROAIR HFA;PROVENTIL HFA;VENTOLIN HFA) 90 mcg/actuation inhaler [Pharmacy Med Name: ALBUTEROL HFA (VENTOLIN) INH] 18 Inhaler 0     Sig: INHALE 2 PUFFS BY MOUTH EVERY 6 HOURS AS NEEDED FOR WHEEZE       Albuterol/Levalbuterol Refill Protocol Passed - 4/13/2020 12:24 AM        Passed - PCP or prescribing provider visit in last year     Last office visit with prescriber/PCP: Visit date not found OR same dept: Visit date not found OR same specialty: 4/8/2019 Hayley Solis MD Last physical: Visit date not found       Next appt within 3 mo: Visit date not found  Next physical within 3 mo: Visit date not found  Prescriber OR PCP: Joseph Levine CNP  Last diagnosis associated with med order: 1. Cough  - albuterol (PROAIR HFA;PROVENTIL HFA;VENTOLIN HFA) 90 mcg/actuation inhaler [Pharmacy Med Name: ALBUTEROL HFA (VENTOLIN) INH]; INHALE 2 PUFFS BY MOUTH EVERY 6 HOURS AS NEEDED FOR WHEEZE  Dispense: 18 Inhaler; Refill: 0    If protocol passes may refill for 6 months if within 3 months of last provider visit (or a total of 9 months). If patient requesting >1 inhaler per month refill x 6 months and have patient make appointment with provider.

## 2021-06-07 NOTE — TELEPHONE ENCOUNTER
Patient states he has done an E-Visit twice and was told to do a phone visit with his PCP.  Transferred to scheduling to set up.    Jenny Vargas RN  [facility] Triage Nurse Advisor

## 2021-06-07 NOTE — PROGRESS NOTES
"Ben Zarate is a 49 y.o. male who is being evaluated via a billable telephone visit.      The patient has been notified of following:     \"This telephone visit will be conducted via a call between you and your physician/provider. We have found that certain health care needs can be provided without the need for a physical exam.  This service lets us provide the care you need with a short phone conversation.  If a prescription is necessary we can send it directly to your pharmacy.  If lab work is needed we can place an order for that and you can then stop by our lab to have the test done at a later time.    If during the course of the call the physician/provider feels a telephone visit is not appropriate, you will not be charged for this service.\"     Physician has received verbal consent for a Telephone Visit from the patient? Yes    Ben Zarate complains of  No chief complaint on file.      I have reviewed and updated the patient's Past Medical History, Social History, Family History and Medication List.    ALLERGIES  Patient has no known allergies.    Additional provider notes: Patient presents today with 3 days of symptoms including body aches, fever of 100.5 which has self resolved, and a dry cough.  He notes some intermittent worsening shortness of breath.  This is not constant.  No respiratory distress.  Denies nausea and vomiting.  No constipation or diarrhea.  He did have some chills when his temperature was up, but that is now gone.  No dizziness or lightheadedness.  Non-smoker.  No specific sick contacts.  He did have influenza B in late January.  No rashes on his body.  No known sick contacts.      Assessment/Plan:  1. Cough  Discussed symptom management.  As needed albuterol, benzonatate, and cough syrup.  Discussed with the patient that should his shortness of breath worsen or become constant, he needs evaluation in the emergency department.  Should he develop chest pain, ER.    - albuterol " (PROAIR HFA;PROVENTIL HFA;VENTOLIN HFA) 90 mcg/actuation inhaler; Inhale 2 puffs every 6 (six) hours as needed for wheezing.  Dispense: 1 each; Refill: 0  - benzonatate (TESSALON) 200 MG capsule; Take 1 capsule (200 mg total) by mouth 3 (three) times a day as needed for cough.  Dispense: 30 capsule; Refill: 0  - codeine-guaiFENesin (CODEINE-GUAIFENESIN)  mg/5 mL liquid; Take 5 mL by mouth 3 (three) times a day as needed.  Dispense: 236 mL; Refill: 0    2. Suspected Covid-19 Virus Infection  Discussed with the patient that symptoms are consistent with suspected coronavirus infection.  We discussed how long to stay out of work, how long this will last for, and appropriate quarantine measures.  Unfortunately we are unable to test him to confirm given current lack of supplies.    Phone call duration:  15 minutes    Joseph Levine, CNP

## 2021-06-08 NOTE — PROGRESS NOTES
Assessment:     1. Left hand pain  XR Hand Left 3 or More VWS     I personally reviewed the xray, no fracture seen.   Xr Hand Left 3 Or More Vws    Result Date: 2/6/2017  XR HAND LEFT 3 OR MORE VWS2/6/2017 1:27 PMINDICATION: 3rd MCP joint painCOMPARISON: None.FINDINGS: Negative left hand. No fracture or dislocation. Well-corticated calcification adjacent to ulnar styloid process should represent an ossicle.This report was  electronically interpreted by: Dr. Jelani Earl MD ON 02/06/2017 at 13:34    No fracture present. Most consistent with tendonitis. No evidence of arthritis on xray and not consistent on hx.     Plan:   Left hand pain at 3rd MCP joint  -Rest, limit activities that are painful or irritating  -Apply ice 2- 3 times daily for 15 -20 min  -May take Ibuprofen 400mg three times daily with food for 7-10 days and then as needed for pain.  -Follow up with primary care if symptoms are not improving for re-evaluation        Subjective:       Ben Zarate is a 46 y.o. male who presents for evaluation of left hand injury for about 2 -3 wks.  This started at the end of 3 games of bowling and he started to have pain in 3rd finger MCP joint after hyperextending it. He rates the pain currently as 5/10 with movement and has limited ability to grasp. Symptoms have waxed and waned. He has taken Ibuprofen intermittently and iced occasionally. At times he has limited flexion d/t pain.S Denies paresthesia, erythema, edema, warmth or ecchymosis. Denies previous injury. He is left handed.     Review of Systems  Pertinent items are noted in HPI.      Objective:        Visit Vitals     /82     Pulse 78     Temp 98.3  F (36.8  C) (Oral)     Resp 20     Wt (!) 255 lb 8 oz (115.9 kg)     SpO2 97%     General appearance: alert, appears stated age and cooperative  Skin: left hand has no erythema, edema, warmth or ecchymosis  Neurologic: Grossly normal  Musc: TTP at the left 3rd MCP joint, strength 4/5 in 3rd finger,  limited  strength in left hand. Resisted tendon flexion and extension intact at DIP, PIP and MCP joints but pain with flexion.

## 2021-06-15 NOTE — PROGRESS NOTES
"Chief Complaint   Patient presents with     Follow-up     cellulitis on nose, renal test       HPI: Pt f/u with cellulitis from  hosp 26 Dec 17.  Discharged on Keflex.  Patient was admitted for cellulitis and he showed me pictures showing market swelling and erythema of the nose bilaterally.  He was seen by ENT, initially started on vancomycin and then converted to Keflex.  He took Keflex and took his last pill yesterday.  He felt that the erythema improved but now is slightly worse today.    He also had elevated blood pressures in the hospital although review of all of his outpatient readings show normal limits.    ROS: No fever.  No vomiting or diarrhea or intolerance to antibiotics.  No chest pain or shortness of breath.    SH: The Patient's  reports that he has never smoked. He has never used smokeless tobacco. He reports that he drinks alcohol. He reports that he does not use illicit drugs.     FH: The Patient's family history is not on file.    Meds:  Ben has a current medication list which includes the following prescription(s): acetaminophen, hydrochlorothiazide, ibuprofen, omeprazole, and omeprazole.    O:  /80  Pulse 97  Resp 16  Ht 5' 10\" (1.778 m)  Wt (!) 262 lb 8 oz (119.1 kg)  SpO2 98%  BMI 37.66 kg/m2  Patient is alert conversant no acute distress  ENT-examination of the nose shows an area approximately 1 cm circular erythema on the very tip of the nose.  The rest of the ENT exam is normal.    A/P:   1. Cellulitis  Refilled Keflex 500 4 times daily for 10 days  Return if erythema does not disappear    2. Hypertension  We discussed hypertension in detail.  Recommended checking blood pressure 2-3 times a week for the next 4 weeks and if readings are above 140/90 he will return.  We also agreed that he will come in for a annual physical examination in the near future.  Recommended increased exercise, weight loss and healthy diet.                                          "

## 2021-06-16 NOTE — PROGRESS NOTES
"Chief Complaint   Patient presents with     Follow-up     cellulitis on nose       HPI: Very pleasant 47-year-old male who continues to [place] of red erythematous swollen nose.  He was changed to Augmentin he feels is helping him slightly but continues to have swelling and erythema.  No substantial pain.  No drainage from the nose.    ROS: No fever.  No drainage from the nose.  No headaches or vision changes.    SH: The Patient's  reports that he has never smoked. He has never used smokeless tobacco. He reports that he drinks alcohol. He reports that he does not use illicit drugs.     FH: The Patient's family history is not on file.    Meds:  Ben has a current medication list which includes the following prescription(s): acetaminophen, amoxicillin-clavulanate, hydrochlorothiazide, ibuprofen, omeprazole, and omeprazole.    O:  /80  Pulse 89  Resp 18  Ht 5' 10\" (1.778 m)  Wt (!) 264 lb 4 oz (119.9 kg)  SpO2 98%  BMI 37.92 kg/m2  Examination of the nose shows mild erythema at the distal tip of the nose and surrounding sides.  Nares are patent.  No drainage noted.  No redness on the cheeks or lips.      Summary of old records: Patient was hospitalized on 12/24/2017 and given vancomycin.    Laboratory: Review of laboratory shows patient was noted to have MSSA sensitive to clindamycin and cefazolin and vancomycin.    A/P:   1. Cellulitis  - Ambulatory referral to ENT    2. Cellulitis of face  - amoxicillin-clavulanate (AUGMENTIN) 875-125 mg per tablet; Take 1 tablet by mouth 2 (two) times a day for 10 days.  Dispense: 20 tablet; Refill: 0                                          "

## 2021-06-16 NOTE — PROGRESS NOTES
"Chief Complaint   Patient presents with     Wound Infection       HPI: Ben presents today for new onset redness in his nose.  Patient was admitted on 12/24/2017 and old notes from Dr. Ferreira were reviewed notable for cellulitis.  He was subsequently given Keflex as an outpatient and did well.  What is new and different today is increased erythema starting last night, 18 hours ago, it is mild, continuous, and mildly annoying and minimally painful.    ROS: No fever chills or rhinorrhea    SH: The Patient's  reports that he has never smoked. He has never used smokeless tobacco. He reports that he drinks alcohol. He reports that he does not use illicit drugs.     FH: The Patient's family history is not on file.    Meds:  Ben has a current medication list which includes the following prescription(s): ibuprofen, omeprazole, omeprazole, acetaminophen, cephalexin, and hydrochlorothiazide.    O:  /90  Pulse 80  Resp 16  Ht 5' 10\" (1.778 m)  Wt (!) 267 lb 6 oz (121.3 kg)  BMI 38.36 kg/m2  Examination of the nose shows mild erythema at the very tip of the nose consistent with early cellulitis.  It is about 2 cm circular in area    A/P:   1. Cellulitis  -If patient not improving would consider further workup.  Hopefully we can avoid hospitalization.  Due to this fact this is recurring if it would happen again would consider referral.  - cephalexin (KEFLEX) 500 MG capsule; Take 1 capsule (500 mg total) by mouth 4 (four) times a day for 10 days.  Dispense: 40 capsule; Refill: 2                                          "

## 2021-06-18 NOTE — PATIENT INSTRUCTIONS - HE
Patient Instructions by Terrie Ellis MD at 1/29/2020  9:00 AM     Author: Terrie Ellis MD Service: -- Author Type: Physician    Filed: 1/29/2020  9:42 AM Encounter Date: 1/29/2020 Status: Signed    : Terrie Ellis MD (Physician)         Patient Education     Influenza (Adult)    Influenza is also called the flu. It is a viral illness that affects the air passages of your lungs. It is different from the common cold. The flu can easily be passed from one to person to another. It may be spread through the air by coughing and sneezing. Or it can be spread by touching the sick person and then touching your own eyes, nose, or mouth.  The flu starts 1 to 3 days after you are exposed to the flu virus. It may last for 1 to 2 weeks but many people feel tired or fatigued for many weeks afterward. You usually dont need to take antibiotics unless you have a complication. This might be an ear or sinus infection or pneumonia.  Symptoms of the flu may be mild or severe. They can include extreme tiredness (wanting to stay in bed all day), chills, fevers, muscle aches, soreness with eye movement, headache, and a dry, hacking cough.  Home care  Follow these guidelines when caring for yourself at home:    Avoid being around cigarette smoke, whether yours or other peoples.    Acetaminophen or ibuprofen will help ease your fever, muscle aches, and headache. Dont give aspirin to anyone younger than 18 who has the flu. Aspirin can harm the liver.    Nausea and loss of appetite are common with the flu. Eat light meals. Drink 6 to 8 glasses of liquids every day. Good choices are water, sport drinks, soft drinks without caffeine, juices, tea, and soup. Extra fluids will also help loosen secretions in your nose and lungs.    Over-the-counter cold medicines will not make the flu go away faster. But the medicines may help with coughing, sore throat, and congestion in your nose and sinuses. Dont use a decongestant  if you have high blood pressure.    Stay home until your fever has been gone for at least 24 hours without using medicine to reduce fever.  Follow-up care  Follow up with your healthcare provider, or as advised, if you are not getting better over the next week.  If you are age 65 or older, talk with your provider about getting a pneumococcal vaccine every 5 years. You should also get this vaccine if you have chronic asthma or COPD. All adults should get a flu vaccine every fall. Ask your provider about this.  When to seek medical advice  Call your healthcare provider right away if any of these occur:    Cough with lots of colored mucus (sputum) or blood in your mucus    Chest pain, shortness of breath, wheezing, or trouble breathing    Severe headache, or face, neck, or ear pain    New rash with fever    Fever of 100.4 F (38 C) or higher, or as directed by your healthcare provider    Confusion, behavior change, or seizure    Severe weakness or dizziness    You get a new fever or cough after getting better for a few days  Date Last Reviewed: 1/1/2017 2000-2017 [employer]. [address] [place] [address]. All rights reserved. This information is not intended as a substitute for professional medical care. Always follow your healthcare professional's instructions.           Patient Education     Influenza (Adult)    Influenza is also called the flu. It is a viral illness that affects the air passages of your lungs. It is different from the common cold. The flu can easily be passed from one to person to another. It may be spread through the air by coughing and sneezing. Or it can be spread by touching the sick person and then touching your own eyes, nose, or mouth.  The flu starts 1 to 3 days after you are exposed to the flu virus. It may last for 1 to 2 weeks but many people feel tired or fatigued for many weeks afterward. You usually dont need to take antibiotics unless you have a  complication. This might be an ear or sinus infection or pneumonia.  Symptoms of the flu may be mild or severe. They can include extreme tiredness (wanting to stay in bed all day), chills, fevers, muscle aches, soreness with eye movement, headache, and a dry, hacking cough.  Home care  Follow these guidelines when caring for yourself at home:    Avoid being around cigarette smoke, whether yours or other peoples.    Acetaminophen or ibuprofen will help ease your fever, muscle aches, and headache. Dont give aspirin to anyone younger than 18 who has the flu. Aspirin can harm the liver.    Nausea and loss of appetite are common with the flu. Eat light meals. Drink 6 to 8 glasses of liquids every day. Good choices are water, sport drinks, soft drinks without caffeine, juices, tea, and soup. Extra fluids will also help loosen secretions in your nose and lungs.    Over-the-counter cold medicines will not make the flu go away faster. But the medicines may help with coughing, sore throat, and congestion in your nose and sinuses. Dont use a decongestant if you have high blood pressure.    Stay home until your fever has been gone for at least 24 hours without using medicine to reduce fever.  Follow-up care  Follow up with your healthcare provider, or as advised, if you are not getting better over the next week.  If you are age 65 or older, talk with your provider about getting a pneumococcal vaccine every 5 years. You should also get this vaccine if you have chronic asthma or COPD. All adults should get a flu vaccine every fall. Ask your provider about this.  When to seek medical advice  Call your healthcare provider right away if any of these occur:    Cough with lots of colored mucus (sputum) or blood in your mucus    Chest pain, shortness of breath, wheezing, or trouble breathing    Severe headache, or face, neck, or ear pain    New rash with fever    Fever of 100.4 F (38 C) or higher, or as directed by your healthcare  provider    Confusion, behavior change, or seizure    Severe weakness or dizziness    You get a new fever or cough after getting better for a few days  Date Last Reviewed: 1/1/2017 2000-2017 [employer]. [address] [place] [address], [place] [address]. All rights reserved. This information is not intended as a substitute for professional medical care. Always follow your healthcare professional's instructions.

## 2021-06-20 NOTE — LETTER
Letter by Terrie Ellis MD at      Author: Terrie Ellis MD Service: -- Author Type: --    Filed:  Encounter Date: 1/29/2020 Status: (Other)         January 29, 2020     Patient: Ben Zarate   YOB: 1970   Date of Visit: 1/29/2020       To Whom It May Concern:    It is my medical opinion that Ben Zarate should remain out of work until 2/3/2020..    If you have any questions or concerns, please don't hesitate to call.    Sincerely,        Electronically signed by Terrie Ellis MD

## 2021-06-20 NOTE — LETTER
Letter by Joseph Levine CNP at      Author: Joseph Levine CNP Service: -- Author Type: --    Filed:  Encounter Date: 3/27/2020 Status: (Other)             Work/School Note  Ben Zarate was seen at the doctor on 3/27/2020. Based on the symptoms he describes, there is suspicion for infection with COVID-19. Please excuse from work for at least 7 days and until he is symptom free for 72 hours.  Remarks: If you have any further questions please call our office.    Sincerely,        Joseph Levine CNP  3/27/2020  11:33 AM

## 2021-07-03 NOTE — ADDENDUM NOTE
Addendum Note by Hayley Solis MD at 3/25/2019 11:15 AM     Author: Hayley Solis MD Service: -- Author Type: Physician    Filed: 3/25/2019  1:24 PM Encounter Date: 3/25/2019 Status: Signed    : Hayley Solis MD (Physician)    Addended by: HAYLEY SOLIS on: 3/25/2019 01:24 PM        Modules accepted: Orders

## 2021-07-03 NOTE — ADDENDUM NOTE
Addendum Note by Hayley Solis MD at 4/8/2019  8:30 AM     Author: Hayley Solis MD Service: -- Author Type: Physician    Filed: 4/8/2019 10:41 AM Encounter Date: 4/8/2019 Status: Signed    : Hayley Solis MD (Physician)    Addended by: HAYLEY SOLIS on: 4/8/2019 10:41 AM        Modules accepted: Orders

## 2021-08-08 ENCOUNTER — HEALTH MAINTENANCE LETTER (OUTPATIENT)
Age: 51
End: 2021-08-08

## 2021-10-03 ENCOUNTER — HEALTH MAINTENANCE LETTER (OUTPATIENT)
Age: 51
End: 2021-10-03

## 2022-05-17 ENCOUNTER — HOSPITAL ENCOUNTER (EMERGENCY)
Facility: CLINIC | Age: 52
Discharge: HOME | End: 2022-05-17
Attending: EMERGENCY MEDICINE | Admitting: EMERGENCY MEDICINE
Payer: COMMERCIAL

## 2022-05-17 VITALS
RESPIRATION RATE: 18 BRPM | DIASTOLIC BLOOD PRESSURE: 98 MMHG | TEMPERATURE: 98.6 F | HEART RATE: 98 BPM | SYSTOLIC BLOOD PRESSURE: 157 MMHG | OXYGEN SATURATION: 98 % | BODY MASS INDEX: 40.8 KG/M2 | HEIGHT: 70 IN | WEIGHT: 285 LBS

## 2022-05-17 DIAGNOSIS — S05.8X1A SUPERFICIAL INJURY OF RIGHT CORNEA, INITIAL ENCOUNTER: ICD-10-CM

## 2022-05-17 PROCEDURE — 99283 EMERGENCY DEPT VISIT LOW MDM: CPT

## 2022-05-17 PROCEDURE — 250N000009 HC RX 250: Performed by: EMERGENCY MEDICINE

## 2022-05-17 RX ORDER — TETRACAINE HYDROCHLORIDE 5 MG/ML
1-2 SOLUTION OPHTHALMIC ONCE
Status: COMPLETED | OUTPATIENT
Start: 2022-05-17 | End: 2022-05-17

## 2022-05-17 RX ORDER — ERYTHROMYCIN 5 MG/G
0.5 OINTMENT OPHTHALMIC 4 TIMES DAILY
Qty: 10 G | Refills: 0 | Status: SHIPPED | OUTPATIENT
Start: 2022-05-17 | End: 2022-05-22

## 2022-05-17 RX ADMIN — TETRACAINE HYDROCHLORIDE 2 DROP: 5 SOLUTION OPHTHALMIC at 20:21

## 2022-05-17 RX ADMIN — FLUORESCEIN SODIUM 1 STRIP: 1 STRIP OPHTHALMIC at 20:19

## 2022-05-17 ASSESSMENT — ENCOUNTER SYMPTOMS
CHILLS: 0
COUGH: 0
EYE REDNESS: 1
DIAPHORESIS: 0
EYE PAIN: 1
FEVER: 0

## 2022-05-17 ASSESSMENT — VISUAL ACUITY
OS: 20/20
OD: 20/40

## 2022-05-17 NOTE — Clinical Note
Ben Zarate was seen and treated in our emergency department on 5/17/2022.  He may return to work on 05/18/2022.  If he is feeling improved, he may return to work     If you have any questions or concerns, please don't hesitate to call.      Magui Khanna MD

## 2022-05-18 NOTE — ED PROVIDER NOTES
EMERGENCY DEPARTMENT ENCOUNTER      NAME: Ben Zarate  AGE: 51 year old male  YOB: 1970  MRN: [id number]  EVALUATION DATE & TIME: 2022  7:46 PM    PCP: Chin Juárez    ED PROVIDER: Magui Khanna MD      Chief Complaint   Patient presents with     Eye Pain       FINAL IMPRESSION:  1. Superficial injury of right cornea, initial encounter        ED COURSE & MEDICAL DECISION MAKIN:55 PM I met with the patient for the initial interview and physical examination. Discussed plan for treatment and workup in the ED.    8:23 PM I discussed the plan for discharge with the patient, and patient is agreeable. We discussed supportive cares at home and reasons for return to the ER including new or worsening symptoms - all questions and concerns addressed. Patient to be discharged by RN.       Pertinent Labs & Imaging studies reviewed. (See chart for details)  51 year old male presents to the Emergency Department for evaluation of foreign body sensation in the right eye.  The patient reports that he was sitting in his car when he felt like something went in his eye.  He has continued to have irritated feeling.  He still feels like there is something in the back corner of his right eye.  He states that with this, he has had tearing, feels as if his vision is blurry.  On my evaluation, the patient's symptoms resolved with tetracaine ophthalmic drops and with evaluation with fluorescein and Woods lamp, there is no corneal abrasion visualized.  There were no other lesions noted on the eye.  There is no foreign body in the upper eyelids that can be seen.  Patient with likely superficial corneal injury.  Plan to treat like a corneal abrasion with erythromycin and referral to ophthalmology to be seen in the next 1 to 2 days.  Patient does not wear contacts; he does wear glasses.  No signs of periorbital erythema or edema.    At the conclusion of the encounter I discussed the results of all of the  tests and the disposition. The questions were answered. The patient or family acknowledged understanding and was agreeable with the care plan.       MEDICATIONS GIVEN IN THE EMERGENCY:  Medications   tetracaine (PONTOCAINE) 0.5 % ophthalmic solution 1-2 drop (2 drops Both Eyes Given by Other 5/17/22 2021)   fluorescein (FUL-COREY) ophthalmic strip 1 strip (1 strip Left Eye Given by Other 5/17/22 2019)     NEW PRESCRIPTIONS STARTED AT TODAY'S ER VISIT  Discharge Medication List as of 5/17/2022  8:14 PM      START taking these medications    Details   erythromycin (ROMYCIN) 5 MG/GM ophthalmic ointment Place 0.5 inches into the right eye 4 times daily for 5 daysDisp-10 g, R-0Local Print           =================================================================    HPI    Patient information was obtained from: Patient    Use of : N/A         Ben Zarate is a 51 year old male with no pertinent recorded medical history who presents to this ED via walk-in for evaluation of eye irritation.     The patient states that around 1800 this evening while he was in his car about to go into work, he suddenly felt as if there was something stuck in his right eye. He tried getting it out and used the eye wash station at his work for about 15 minutes without relief. Notes that his right eye vision appears more blurry. Patient does not use eye contacts. Patient denies cough, congestion, fever, chills, sweats, and any other symptoms or complaints at this time.     ScHx: Nonsmoker      REVIEW OF SYSTEMS   Review of Systems   Constitutional: Negative for chills, diaphoresis and fever.   HENT: Negative for congestion.    Eyes: Positive for pain (right), redness (right) and visual disturbance (right blurred).        Positive for sensation of foreign body in right eye   Respiratory: Negative for cough.    All other systems reviewed and are negative.       PAST MEDICAL HISTORY:  Past Medical History:   Diagnosis Date     Allergic  "state      Gastro-oesophageal reflux disease      GERD (gastroesophageal reflux disease)      Hiatal hernia      PAST SURGICAL HISTORY:  Past Surgical History:   Procedure Laterality Date     HERNIA REPAIR       HERNIA REPAIR       CURRENT MEDICATIONS:    erythromycin (ROMYCIN) 5 MG/GM ophthalmic ointment  OMEPRAZOLE PO      ALLERGIES:  No Known Allergies      FAMILY HISTORY:  No family history on file.    SOCIAL HISTORY:   Social History     Socioeconomic History     Marital status:    Tobacco Use     Smoking status: Never Smoker     Smokeless tobacco: Never Used     Tobacco comment: no vaping   Substance and Sexual Activity     Alcohol use: Yes     Comment: Alcoholic Drinks/day: rare     Drug use: No       VITALS:  BP (!) 157/98   Pulse 98   Temp 98.6  F (37  C)   Resp 18   Ht 1.778 m (5' 10\")   Wt 129.3 kg (285 lb)   SpO2 98%   BMI 40.89 kg/m      PHYSICAL EXAM    Constitutional: Well developed, Well nourished, NAD  HENT: Normocephalic, Atraumatic, Bilateral external ears normal, Oropharynx normal, mucous membranes moist, Nose normal.   Neck- Normal range of motion, No tenderness, Supple, No stridor.  Eyes: PERRL, EOMI. Inverted right upper lid, no foreign body visualized. Used tetracaine and fluorescein dye with woods lamp, no corneal abrasion, conjunctivas slightly erythematous, clear discharge.   Respiratory: Normal breath sounds, No respiratory distress  Cardiovascular: Normal heart rate, Regular rhythm  Musculoskeletal: No edema. Good range of motion in all major joints. No tenderness to palpation or major deformities noted.   Integument: Warm, Dry, No erythema, No rash  Neurologic: Alert & oriented x 3, Normal motor function, Normal sensory function, No focal deficits noted. Normal gait.   Psychiatric: Affect normal, Judgment normal, Mood normal.        LAB:  All pertinent labs reviewed and interpreted.       RADIOLOGY:  Reviewed all pertinent imaging. Please see official radiology report.  No " orders to display         I, Tita Ronald, am serving as a scribe to document services personally performed by Magui Khanna, based on my observation and the provider's statements to me. I, Magui Khanna MD, attest that Tita Cardenas is acting in a scribe capacity, has observed my performance of the services and has documented them in accordance with my direction.    Magui Khanna MD  Emergency Medicine  [facility] EMERGENCY ROOM  [address] [facility] [address] [phone]      Magui Khanna MD  05/17/22 1558

## 2022-05-18 NOTE — ED TRIAGE NOTES
Pt reports he was getting ready for work, noted it felt like he got something in his R eye. Tried to flush it out at a sink, then tried to use an eye wash station at work. Reports still feels like something is in R eye. Appears reddened. Reports feels like vision is blurred.

## 2022-09-10 ENCOUNTER — HEALTH MAINTENANCE LETTER (OUTPATIENT)
Age: 52
End: 2022-09-10

## 2022-10-18 ENCOUNTER — OFFICE VISIT (OUTPATIENT)
Dept: FAMILY MEDICINE | Facility: CLINIC | Age: 52
End: 2022-10-18
Payer: COMMERCIAL

## 2022-10-18 VITALS
BODY MASS INDEX: 40.18 KG/M2 | WEIGHT: 280 LBS | SYSTOLIC BLOOD PRESSURE: 145 MMHG | HEART RATE: 94 BPM | DIASTOLIC BLOOD PRESSURE: 95 MMHG | TEMPERATURE: 99 F | OXYGEN SATURATION: 99 %

## 2022-10-18 DIAGNOSIS — B34.9 VIRAL SYNDROME: ICD-10-CM

## 2022-10-18 DIAGNOSIS — Z11.52 ENCOUNTER FOR SCREENING FOR COVID-19: ICD-10-CM

## 2022-10-18 DIAGNOSIS — J98.01 BRONCHOSPASM: Primary | ICD-10-CM

## 2022-10-18 LAB
FLUAV AG SPEC QL IA: NEGATIVE
FLUBV AG SPEC QL IA: NEGATIVE
SARS-COV-2 RNA RESP QL NAA+PROBE: NEGATIVE

## 2022-10-18 PROCEDURE — U0005 INFEC AGEN DETEC AMPLI PROBE: HCPCS | Performed by: PHYSICIAN ASSISTANT

## 2022-10-18 PROCEDURE — U0003 INFECTIOUS AGENT DETECTION BY NUCLEIC ACID (DNA OR RNA); SEVERE ACUTE RESPIRATORY SYNDROME CORONAVIRUS 2 (SARS-COV-2) (CORONAVIRUS DISEASE [COVID-19]), AMPLIFIED PROBE TECHNIQUE, MAKING USE OF HIGH THROUGHPUT TECHNOLOGIES AS DESCRIBED BY CMS-2020-01-R: HCPCS | Performed by: PHYSICIAN ASSISTANT

## 2022-10-18 PROCEDURE — 99214 OFFICE O/P EST MOD 30 MIN: CPT | Mod: CS | Performed by: PHYSICIAN ASSISTANT

## 2022-10-18 PROCEDURE — 87804 INFLUENZA ASSAY W/OPTIC: CPT | Performed by: PHYSICIAN ASSISTANT

## 2022-10-18 RX ORDER — ALBUTEROL SULFATE 90 UG/1
2 AEROSOL, METERED RESPIRATORY (INHALATION) EVERY 6 HOURS
Qty: 18 G | Refills: 0 | Status: SHIPPED | OUTPATIENT
Start: 2022-10-18 | End: 2022-12-16

## 2022-10-18 RX ORDER — BENZONATATE 100 MG/1
100 CAPSULE ORAL 3 TIMES DAILY PRN
Qty: 30 CAPSULE | Refills: 0 | Status: SHIPPED | OUTPATIENT
Start: 2022-10-18 | End: 2022-10-28

## 2022-10-18 RX ORDER — AZITHROMYCIN 500 MG/1
500 TABLET, FILM COATED ORAL DAILY
Qty: 5 TABLET | Refills: 0 | Status: SHIPPED | OUTPATIENT
Start: 2022-10-18 | End: 2022-10-23

## 2022-10-18 NOTE — PROGRESS NOTES
Patient presents with:  Shortness of Breath: Has has SOB and chest tightness  cough fever today daughter had COVID last week he tested negative sx's for for 2 days worse last night      Clinical Decision Making:  Influenza test was obtained and was negative.  Culture is to follow.  COVID-19 screening test is negative.  Symptomatic care was gone over. Expected course of resolution and indication for return was gone over and questions were answered to patient/parent's satisfaction before discharge.        ICD-10-CM    1. Bronchospasm  J98.01 albuterol (PROAIR HFA/PROVENTIL HFA/VENTOLIN HFA) 108 (90 Base) MCG/ACT inhaler     azithromycin (ZITHROMAX) 500 MG tablet     benzonatate (TESSALON) 100 MG capsule      2. Viral syndrome  B34.9 Influenza A & B Antigen - Clinic Collect      3. Encounter for screening for COVID-19  Z11.52 Symptomatic; Yes; 10/16/2022 COVID-19 Virus (Coronavirus) by PCR Nose          Patient Instructions   You were seen today for acute bronchitis.     Symptom management:  - Get plenty of rest  - Avoid smoking and second hand smoke  - May take tylenol or ibuprofen for fever/discomfort  - Drink plenty of non-caffeinated fluids  - Use nasal steroid spray for sinus congestion  - Albuterol inhaler may be used every 6 hours as needed for chest tightness      Reasons to be seen in the emergency room:  - Develop a fever of 100.4 or higher  - Cough changes, coughing up blood, or become short of breath  - Neck stiffness  - Chest pain  - Severe headache  - Unable to tolerate eating or drinking fluids    Otherwise, if no symptom improvement after 5 days, follow-up with your primary care provider.          HPI:  Ben Zarate is a 52 year old male who presents today 2-day worsening history of chest congestion cough and low-grade subjective fever.  Temperature in the office is currently 99 degrees.  Daughter has had COVID last week and patient tested at home negative COVID test yesterday.  Patient is  rescreened for COVID in the office today.  Currently he is eating does not have fever chills night sweats vomiting diarrhea skin rash abdominal pain or urinary symptoms to report.    History obtained from chart review and the patient.    Problem List:  Morbid obesity (H)  Essential hypertension      Past Medical History:   Diagnosis Date     Allergic state      Gastro-oesophageal reflux disease      GERD (gastroesophageal reflux disease)      Hiatal hernia        Social History     Tobacco Use     Smoking status: Never     Smokeless tobacco: Never     Tobacco comments:     no vaping   Substance Use Topics     Alcohol use: Yes     Comment: Alcoholic Drinks/day: rare       Review of Systems  As above in HPI otherwise negative.    Vitals:    10/18/22 1127   BP: (!) 145/95   Pulse: 94   Temp: 99  F (37.2  C)   TempSrc: Oral   SpO2: 99%   Weight: 127 kg (280 lb)       General: Patient is resting comfortably no acute distress is afebrile  HEENT: Head is normocephalic atraumatic   eyes are PERRL EOMI sclera anicteric   TMs are clear bilaterally  Throat is clear and no exudate  No cervical lymphadenopathy present  LUNGS: Clear to auscultation bilaterally  HEART: Regular rate and rhythm normal respiratory effort and excursion.  Skin: Without rash non-diaphoretic    Physical Exam      Labs:  Results for orders placed or performed in visit on 10/18/22   Symptomatic; Yes; 10/16/2022 COVID-19 Virus (Coronavirus) by PCR Nose     Status: Normal    Specimen: Nose; Swab   Result Value Ref Range    SARS CoV2 PCR Negative Negative    Narrative    Testing was performed using the Xpert Xpress SARS-CoV-2 Assay on the  Cepheid Gene-Xpert Instrument Systems. Additional information about  this Emergency Use Authorization (EUA) assay can be found via the Lab  Guide. This test should be ordered for the detection of SARS-CoV-2 in  individuals who meet SARS-CoV-2 clinical and/or epidemiological  criteria. Test performance is unknown in  asymptomatic patients. This  test is for in vitro diagnostic use under the FDA EUA for  laboratories certified under CLIA to perform high complexity testing.  This test has not been FDA cleared or approved. A negative result  does not rule out the presence of PCR inhibitors in the specimen or  target RNA in concentration below the limit of detection for the  assay. The possibility of a false negative should be considered if  the patient's recent exposure or clinical presentation suggests  COVID-19. This test was validated by the [facility]. This laboratory is certified under  the Clinical Laboratory Improvement Amendments of 1988 (CLIA-88) as  qualified to perform high complexity laboratory testing.     Influenza A & B Antigen - Clinic Collect     Status: Normal    Specimen: Nose; Swab   Result Value Ref Range    Influenza A antigen Negative Negative    Influenza B antigen Negative Negative    Narrative    Test results must be correlated with clinical data. If necessary, results should be confirmed by a molecular assay or viral culture.     At the end of the encounter, I discussed results, diagnosis, medications. Discussed red flags for immediate return to clinic/ER, as well as indications for follow up if no improvement. Patient understood and agreed to plan. Patient was stable for discharge.

## 2022-10-19 ENCOUNTER — TELEPHONE (OUTPATIENT)
Dept: NURSING | Facility: CLINIC | Age: 52
End: 2022-10-19

## 2022-10-19 NOTE — TELEPHONE ENCOUNTER
Coronavirus (COVID-19) Notification    Lab Result   Lab test 2019-nCoV rRt-PCR OR SARS-COV-2 PCR    Nasopharyngeal AND/OR Oropharyngeal swab is NEGATIVE for 2019-nCoV RNA [OR] SARS-COV-2 RNA (COVID-19) RNA    Your result was negative. This means that we didn't find the virus that causes COVID-19 in your sample. A test may show negative when you do actually have the virus. This can happen when the virus is in the early stages of infection, before you feel illness symptoms.    If you have symptoms  Stay home and away from others (self-isolate) until you meet ALL of the guidelines below:    You've had no fever--and no medicine that reduces fever--for 1 full day (24 hours). And      Your other symptoms have gotten better. For example, your cough or breathing has improved. And   ? At least 10 days have passed since your symptoms started. (If you've been told by a doctor that you have a weak immune system, wait 20 days.)     During this time    Stay home. Don't go to work, school or anywhere else.     Stay in your own room, including for meals. Use your own bathroom if you can.    Stay away from others in your home. No hugging, kissing or shaking hands. No visitors.    Clean  high touch  surfaces often (doorknobs, counters, handles, etc.). Use a household cleaning spray or wipes. You can find a full list on the EPA website at www.epa.gov/pesticide-registration/list-n-disinfectants-use-against-sars-cov-2.    Cover your mouth and nose with a mask, tissue or other face covering to avoid spreading germs.    Wash your hands and face often with soap and water.    Going back to work  Check with your employer for any guidelines to follow for going back to work.  You are sent a letter for your Employer which will serve as formal document notice that you, the employee, tested negative for COVID-19, as of the testing date shown above.    If your symptoms worsen or other concerning symptoms, contact PCP, oncare or consider returning  to Emergency Dept.    Where can I get more information?    [facility]: [email or web address]    Coronavirus Basics: [email or web address]/basics.[email or web address]    [facility] Hotline ([phone])    Deanna Engel RN

## 2022-12-16 ENCOUNTER — OFFICE VISIT (OUTPATIENT)
Dept: INTERNAL MEDICINE | Facility: CLINIC | Age: 52
End: 2022-12-16
Payer: COMMERCIAL

## 2022-12-16 VITALS
DIASTOLIC BLOOD PRESSURE: 90 MMHG | OXYGEN SATURATION: 97 % | HEIGHT: 70 IN | BODY MASS INDEX: 39.08 KG/M2 | TEMPERATURE: 98.9 F | SYSTOLIC BLOOD PRESSURE: 146 MMHG | WEIGHT: 273 LBS | RESPIRATION RATE: 24 BRPM | HEART RATE: 97 BPM

## 2022-12-16 DIAGNOSIS — E66.01 MORBID OBESITY (H): ICD-10-CM

## 2022-12-16 DIAGNOSIS — I10 ESSENTIAL HYPERTENSION: ICD-10-CM

## 2022-12-16 DIAGNOSIS — E11.40 TYPE 2 DIABETES MELLITUS WITH DIABETIC NEUROPATHY, WITHOUT LONG-TERM CURRENT USE OF INSULIN (H): ICD-10-CM

## 2022-12-16 DIAGNOSIS — R20.0 NUMBNESS IN FEET: ICD-10-CM

## 2022-12-16 DIAGNOSIS — E11.9 NEW ONSET TYPE 2 DIABETES MELLITUS (H): ICD-10-CM

## 2022-12-16 DIAGNOSIS — M54.42 ACUTE BILATERAL LOW BACK PAIN WITH LEFT-SIDED SCIATICA: Primary | ICD-10-CM

## 2022-12-16 LAB
ALBUMIN SERPL BCG-MCNC: 4.2 G/DL (ref 3.5–5.2)
ALBUMIN UR-MCNC: ABNORMAL MG/DL
ALP SERPL-CCNC: 80 U/L (ref 40–129)
ALT SERPL W P-5'-P-CCNC: 32 U/L (ref 10–50)
ANION GAP SERPL CALCULATED.3IONS-SCNC: 10 MMOL/L (ref 7–15)
APPEARANCE UR: CLEAR
AST SERPL W P-5'-P-CCNC: 31 U/L (ref 10–50)
BACTERIA #/AREA URNS HPF: ABNORMAL /HPF
BASOPHILS # BLD AUTO: 0 10E3/UL (ref 0–0.2)
BASOPHILS NFR BLD AUTO: 1 %
BILIRUB SERPL-MCNC: 0.5 MG/DL
BILIRUB UR QL STRIP: NEGATIVE
BUN SERPL-MCNC: 12.6 MG/DL (ref 6–20)
CALCIUM SERPL-MCNC: 9 MG/DL (ref 8.6–10)
CHLORIDE SERPL-SCNC: 104 MMOL/L (ref 98–107)
CHOLEST SERPL-MCNC: 160 MG/DL
COLOR UR AUTO: YELLOW
CREAT SERPL-MCNC: 1.08 MG/DL (ref 0.67–1.17)
DEPRECATED CALCIDIOL+CALCIFEROL SERPL-MC: 25 UG/L (ref 20–75)
DEPRECATED HCO3 PLAS-SCNC: 26 MMOL/L (ref 22–29)
EOSINOPHIL # BLD AUTO: 0.3 10E3/UL (ref 0–0.7)
EOSINOPHIL NFR BLD AUTO: 4 %
ERYTHROCYTE [DISTWIDTH] IN BLOOD BY AUTOMATED COUNT: 13.4 % (ref 10–15)
FERRITIN SERPL-MCNC: 129 NG/ML (ref 31–409)
GFR SERPL CREATININE-BSD FRML MDRD: 83 ML/MIN/1.73M2
GLUCOSE SERPL-MCNC: 256 MG/DL (ref 70–99)
GLUCOSE UR STRIP-MCNC: >=1000 MG/DL
HBA1C MFR BLD: 8.6 % (ref 0–5.6)
HCT VFR BLD AUTO: 42.4 % (ref 40–53)
HDLC SERPL-MCNC: 32 MG/DL
HGB BLD-MCNC: 14.7 G/DL (ref 13.3–17.7)
HGB UR QL STRIP: NEGATIVE
IMM GRANULOCYTES # BLD: 0 10E3/UL
IMM GRANULOCYTES NFR BLD: 0 %
IRON BINDING CAPACITY (ROCHE): 359 UG/DL (ref 240–430)
IRON SATN MFR SERPL: 27 % (ref 15–46)
IRON SERPL-MCNC: 97 UG/DL (ref 61–157)
KETONES UR STRIP-MCNC: NEGATIVE MG/DL
LDLC SERPL CALC-MCNC: 75 MG/DL
LEUKOCYTE ESTERASE UR QL STRIP: NEGATIVE
LYMPHOCYTES # BLD AUTO: 1.5 10E3/UL (ref 0.8–5.3)
LYMPHOCYTES NFR BLD AUTO: 20 %
MAGNESIUM SERPL-MCNC: 2 MG/DL (ref 1.7–2.3)
MCH RBC QN AUTO: 31.9 PG (ref 26.5–33)
MCHC RBC AUTO-ENTMCNC: 34.7 G/DL (ref 31.5–36.5)
MCV RBC AUTO: 92 FL (ref 78–100)
MONOCYTES # BLD AUTO: 0.5 10E3/UL (ref 0–1.3)
MONOCYTES NFR BLD AUTO: 7 %
MUCOUS THREADS #/AREA URNS LPF: PRESENT /LPF
NEUTROPHILS # BLD AUTO: 5.3 10E3/UL (ref 1.6–8.3)
NEUTROPHILS NFR BLD AUTO: 69 %
NITRATE UR QL: NEGATIVE
NONHDLC SERPL-MCNC: 128 MG/DL
PH UR STRIP: 5.5 [PH] (ref 5–8)
PLATELET # BLD AUTO: 204 10E3/UL (ref 150–450)
POTASSIUM SERPL-SCNC: 4.3 MMOL/L (ref 3.4–5.3)
PROT SERPL-MCNC: 7 G/DL (ref 6.4–8.3)
RBC # BLD AUTO: 4.61 10E6/UL (ref 4.4–5.9)
RBC #/AREA URNS AUTO: ABNORMAL /HPF
SODIUM SERPL-SCNC: 140 MMOL/L (ref 136–145)
SP GR UR STRIP: 1.02 (ref 1–1.03)
SQUAMOUS #/AREA URNS AUTO: ABNORMAL /LPF
TRIGL SERPL-MCNC: 266 MG/DL
UROBILINOGEN UR STRIP-ACNC: 0.2 E.U./DL
VIT B12 SERPL-MCNC: 621 PG/ML (ref 232–1245)
WBC # BLD AUTO: 7.7 10E3/UL (ref 4–11)
WBC #/AREA URNS AUTO: ABNORMAL /HPF

## 2022-12-16 PROCEDURE — 83540 ASSAY OF IRON: CPT | Performed by: NURSE PRACTITIONER

## 2022-12-16 PROCEDURE — 82306 VITAMIN D 25 HYDROXY: CPT | Performed by: NURSE PRACTITIONER

## 2022-12-16 PROCEDURE — 36415 COLL VENOUS BLD VENIPUNCTURE: CPT | Performed by: NURSE PRACTITIONER

## 2022-12-16 PROCEDURE — 99214 OFFICE O/P EST MOD 30 MIN: CPT | Performed by: NURSE PRACTITIONER

## 2022-12-16 PROCEDURE — 80053 COMPREHEN METABOLIC PANEL: CPT | Performed by: NURSE PRACTITIONER

## 2022-12-16 PROCEDURE — 80061 LIPID PANEL: CPT | Performed by: NURSE PRACTITIONER

## 2022-12-16 PROCEDURE — 83036 HEMOGLOBIN GLYCOSYLATED A1C: CPT | Performed by: NURSE PRACTITIONER

## 2022-12-16 PROCEDURE — 82728 ASSAY OF FERRITIN: CPT | Performed by: NURSE PRACTITIONER

## 2022-12-16 PROCEDURE — 83550 IRON BINDING TEST: CPT | Performed by: NURSE PRACTITIONER

## 2022-12-16 PROCEDURE — 83735 ASSAY OF MAGNESIUM: CPT | Performed by: NURSE PRACTITIONER

## 2022-12-16 PROCEDURE — 81001 URINALYSIS AUTO W/SCOPE: CPT | Performed by: NURSE PRACTITIONER

## 2022-12-16 PROCEDURE — 82607 VITAMIN B-12: CPT | Performed by: NURSE PRACTITIONER

## 2022-12-16 PROCEDURE — 85025 COMPLETE CBC W/AUTO DIFF WBC: CPT | Performed by: NURSE PRACTITIONER

## 2022-12-16 RX ORDER — METFORMIN HCL 500 MG
500 TABLET, EXTENDED RELEASE 24 HR ORAL 2 TIMES DAILY WITH MEALS
Qty: 180 TABLET | Refills: 1 | Status: SHIPPED | OUTPATIENT
Start: 2022-12-16 | End: 2023-03-01

## 2022-12-16 RX ORDER — METHYLPREDNISOLONE 4 MG
TABLET, DOSE PACK ORAL
Qty: 21 TABLET | Refills: 0 | Status: SHIPPED | OUTPATIENT
Start: 2022-12-16 | End: 2023-03-01

## 2022-12-16 RX ORDER — CYCLOBENZAPRINE HCL 5 MG
5 TABLET ORAL
Qty: 30 TABLET | Refills: 0 | Status: SHIPPED | OUTPATIENT
Start: 2022-12-16 | End: 2023-01-15

## 2022-12-16 RX ORDER — LISINOPRIL 10 MG/1
20 TABLET ORAL DAILY
Qty: 90 TABLET | Refills: 0 | Status: SHIPPED | OUTPATIENT
Start: 2022-12-16 | End: 2023-01-19

## 2022-12-16 NOTE — PROGRESS NOTES
Assessment & Plan   Problem List Items Addressed This Visit        Digestive    Morbid obesity (H)    Relevant Medications    metFORMIN (GLUCOPHAGE XR) 500 MG 24 hr tablet    Other Relevant Orders    Lipid Profile (Chol, Trig, HDL, LDL calc) (Completed)       Endocrine    Diabetes mellitus, type 2 (H)    Relevant Medications    methylPREDNISolone (MEDROL DOSEPAK) 4 MG tablet therapy pack    metFORMIN (GLUCOPHAGE XR) 500 MG 24 hr tablet       Circulatory    Essential hypertension    Relevant Medications    lisinopril (ZESTRIL) 10 MG tablet   Other Visit Diagnoses     Acute bilateral low back pain with left-sided sciatica    -  Primary    Relevant Medications    methylPREDNISolone (MEDROL DOSEPAK) 4 MG tablet therapy pack    cyclobenzaprine (FLEXERIL) 5 MG tablet    Numbness in feet        Relevant Medications    methylPREDNISolone (MEDROL DOSEPAK) 4 MG tablet therapy pack    cyclobenzaprine (FLEXERIL) 5 MG tablet    Other Relevant Orders    CBC with platelets and differential (Completed)    Comprehensive metabolic panel (Completed)    Hemoglobin A1c (Completed)    UA Macro with Reflex to Micro and Culture - lab collect (Completed)    Vitamin B12 (Completed)    Iron and iron binding capacity (Completed)    Ferritin (Completed)    Magnesium (Completed)    Vitamin D deficiency screening (Completed)    Urine Microscopic (Completed)    New onset type 2 diabetes mellitus (H)        Relevant Medications    methylPREDNISolone (MEDROL DOSEPAK) 4 MG tablet therapy pack    metFORMIN (GLUCOPHAGE XR) 500 MG 24 hr tablet         Patient noted to have new onset diabetes for lab work today this could be attributing to his numbness and tingling recommend starting metformin he is also on a Medrol Dosepak which will increase his blood sugars.  Additional lab work is pending patient scheduled for follow-up and establish care in the upcoming week.Patient advised if symptoms persist, worsen or new symptoms arise they are to seek medical  "care.  All patients questions addressed. Patient verbalized understanding and agreement with plan.              BMI:   Estimated body mass index is 39.17 kg/m  as calculated from the following:    Height as of this encounter: 1.778 m (5' 10\").    Weight as of this encounter: 123.8 kg (273 lb).       MEDICATIONS:   Orders Placed This Encounter   Medications     methylPREDNISolone (MEDROL DOSEPAK) 4 MG tablet therapy pack     Sig: Follow Package Directions     Dispense:  21 tablet     Refill:  0     cyclobenzaprine (FLEXERIL) 5 MG tablet     Sig: Take 1 tablet (5 mg) by mouth nightly as needed for muscle spasms     Dispense:  30 tablet     Refill:  0     lisinopril (ZESTRIL) 10 MG tablet     Sig: Take 2 tablets (20 mg) by mouth daily     Dispense:  90 tablet     Refill:  0     metFORMIN (GLUCOPHAGE XR) 500 MG 24 hr tablet     Sig: Take 1 tablet (500 mg) by mouth 2 times daily (with meals)     Dispense:  180 tablet     Refill:  1     Medications Discontinued During This Encounter   Medication Reason     albuterol (PROAIR HFA/PROVENTIL HFA/VENTOLIN HFA) 108 (90 Base) MCG/ACT inhaler Stopped by Patient          - Continue other medications without change    No follow-ups on file.    Lisa Cooley NP  [facility] MAYUR Contreras is a 52 year old, presenting for the following health issues:  Back Pain and Numbness to the fingers and toes      History of Present Illness       Back Pain:  He presents for follow up of back pain. Patient's back pain is a new problem.    Original cause of back pain: not sure  First noticed back pain: in the last week  Patient feels back pain: comes and goesLocation of back pain:  Right lower back and left lower back  Description of back pain: dull ache  Back pain spreads: nowhere    Since patient first noticed back pain, pain is: unchanged  Does back pain interfere with his job:  Yes  On a scale of 1-10 (10 being the worst), patient describes pain as:  1  What " "makes back pain worse: bending  Acupuncture: not tried  Acetaminophen: not tried  Activity or exercise: helpful  Chiropractor:  Not tried  Cold: not helpful  Heat: not helpful  Massage: not tried  Muscle relaxants: not tried  NSAIDS: helpful  Opioids: not tried  Physical Therapy: not tried  Rest: not tried  Steroid Injection: not tried  Stretching: helpful  Surgery: not tried  TENS unit: not tried  Topical pain relievers: not helpful  Other healthcare providers patient is seeing for back pain: None    Reason for visit:  Numbness in legs  Symptom onset:  1-2 weeks ago    He eats 0-1 servings of fruits and vegetables daily.He consumes 1 sweetened beverage(s) daily.He exercises with enough effort to increase his heart rate 20 to 29 minutes per day.  He exercises with enough effort to increase his heart rate 3 or less days per week.   He is taking medications regularly.             Review of Systems   Unremarkable other than listed above and below         Objective    BP (!) 146/90 (BP Location: Right arm, Patient Position: Sitting, Cuff Size: Adult Large)   Pulse 97   Temp 98.9  F (37.2  C) (Oral)   Resp 24   Ht 1.778 m (5' 10\")   Wt 123.8 kg (273 lb)   SpO2 97%   BMI 39.17 kg/m    Body mass index is 39.17 kg/m .  Physical Exam   GENERAL: healthy, alert and no distress  EYES: Eyes grossly normal to inspection, PERRL and conjunctivae and sclerae normal  HENT: ear canals and TM's normal, nose and mouth without ulcers or lesions  NECK: no adenopathy  RESP: lungs clear to auscultation - no rales, rhonchi or wheezes  CV: regular rate and rhythm, normal S1 S2  MS: no gross musculoskeletal defects noted, no edema  SKIN: no suspicious lesions or rashes  NEURO: Normal strength and tone, mentation intact and speech normal  PSYCH: mentation appears normal, affect normal/bright                    "

## 2022-12-19 ENCOUNTER — OFFICE VISIT (OUTPATIENT)
Dept: FAMILY MEDICINE | Facility: CLINIC | Age: 52
End: 2022-12-19
Payer: COMMERCIAL

## 2022-12-19 VITALS
HEART RATE: 87 BPM | BODY MASS INDEX: 39.06 KG/M2 | SYSTOLIC BLOOD PRESSURE: 130 MMHG | WEIGHT: 272.2 LBS | OXYGEN SATURATION: 98 % | DIASTOLIC BLOOD PRESSURE: 90 MMHG

## 2022-12-19 DIAGNOSIS — M54.42 ACUTE BILATERAL LOW BACK PAIN WITH LEFT-SIDED SCIATICA: Primary | ICD-10-CM

## 2022-12-19 DIAGNOSIS — I10 ESSENTIAL HYPERTENSION: ICD-10-CM

## 2022-12-19 DIAGNOSIS — R40.0 DAYTIME SOMNOLENCE: ICD-10-CM

## 2022-12-19 DIAGNOSIS — E66.01 MORBID OBESITY (H): ICD-10-CM

## 2022-12-19 DIAGNOSIS — E11.40 TYPE 2 DIABETES MELLITUS WITH DIABETIC NEUROPATHY, WITHOUT LONG-TERM CURRENT USE OF INSULIN (H): ICD-10-CM

## 2022-12-19 PROCEDURE — 99214 OFFICE O/P EST MOD 30 MIN: CPT | Performed by: FAMILY MEDICINE

## 2022-12-19 RX ORDER — IBUPROFEN 200 MG
200 TABLET ORAL
COMMUNITY

## 2022-12-19 RX ORDER — AZELAIC ACID 0.15 G/G
AEROSOL, FOAM TOPICAL
COMMUNITY
Start: 2022-10-05

## 2022-12-19 ASSESSMENT — PAIN SCALES - GENERAL: PAINLEVEL: NO PAIN (0)

## 2022-12-19 NOTE — PROGRESS NOTES
Acute bilateral low back pain with left-sided sciatica  Patient had a fall with low back injury and left ankle sprain.  He is experiencing some sciatica which has improved with Medrol Dosepak.    Type 2 diabetes mellitus with diabetic neuropathy, without long-term current use of insulin (H)  Incidentally found to be diabetic with an A1c of 8.6%.  A UA was done at the time so I will have them add a microalbumin creatinine ratio if possible.  Discussed cutting out all soda as he is a Mountain Dew drinker.  Discussed use of metformin.  - Albumin Random Urine Quantitative with Creat Ratio    Essential hypertension  Diastolic blood pressure is elevated and we discussed the likelihood of obstructive sleep apnea.  Was started on lisinopril 10 mg in the urgent care.    Daytime somnolence  He is fatigued during the day but denies being able to fall asleep easily.  He is not sure if he snores.  I think it likely is obstructive sleep apnea.  Explained to him it may be a number of months before he can have his evaluation.  - Adult Sleep Eval & Management  Referral    Morbid obesity (H)  BMI of 39 with diabetes.  Carries his weight in the middle which puts him at higher risk as well for obstructive sleep apnea.    Discussed with him that he needs to find a primary who can give him regular care which she seems open to.  I will have him establish with Dr. Dominguez in our office.        Madison Contreras is a 52 year old, presenting for the following health issues:  Follow Up (Follow up to Friday appointment at [facility].  Sciatica Dx and possible DM.)  This is a pleasant gentleman who went into the urgent care at [facility] on 12/16 for low back pain with numbness in his toes bilaterally, right occasional and left pretty much all the time but worsening in intensity according to his position.  Labs were done and patient was given steroid Dosepak.  He has improved somewhat with the steroid treatment.  However, labs  showed he was having very elevated glucose and so follow-up A1c was done showing an A1c of 8.6%.  We discussed that this is not affected by whether or not you are fasting.  A prescription was sent for metformin which he has not started yet.  He also mentions that he had an ankle sprain on Friday as well.  He had previously been seen in our clinic for acute issues but has not had any preventative care.  We also discussed the fact that his diastolic blood pressure is quite high and that this is a sign of sleep apnea.  He knows about sleep apnea because his wife has it.  He denies falling asleep during the day but says he is tired all the time.    HPI       Objective    BP (!) 130/90 (BP Location: Right arm, Patient Position: Sitting, Cuff Size: Adult Large)   Pulse 87   Wt 123.5 kg (272 lb 3.2 oz)   SpO2 98%   BMI 39.06 kg/m    Body mass index is 39.06 kg/m .  Physical Exam   GENERAL: alert, no distress and morbidly obese carrying his weight in the abdomen  RESP: lungs clear to auscultation - no rales, rhonchi or wheezes  CV: regular rates and rhythm and without murmur  ABDOMEN: soft, nontender  MS: LLE exam shows swelling, wearing support

## 2023-01-13 DIAGNOSIS — M54.42 ACUTE BILATERAL LOW BACK PAIN WITH LEFT-SIDED SCIATICA: ICD-10-CM

## 2023-01-13 NOTE — TELEPHONE ENCOUNTER
Routing refill request to provider for review/approval because:  Drug not on the [facility] refill protocol     Last Written Prescription Date:  12/16/22  Last Fill Quantity: 30,  # refills: 0   Last office visit provider:  12/16/22     Requested Prescriptions   Pending Prescriptions Disp Refills     cyclobenzaprine (FLEXERIL) 5 MG tablet 30 tablet 0     Sig: Take 1 tablet (5 mg) by mouth nightly as needed for muscle spasms       There is no refill protocol information for this order          Randee Nunez RN 01/13/23 2:03 PM

## 2023-01-15 RX ORDER — CYCLOBENZAPRINE HCL 5 MG
5 TABLET ORAL
Qty: 30 TABLET | Refills: 0 | Status: SHIPPED | OUTPATIENT
Start: 2023-01-15

## 2023-01-17 DIAGNOSIS — M54.42 ACUTE BILATERAL LOW BACK PAIN WITH LEFT-SIDED SCIATICA: ICD-10-CM

## 2023-01-17 DIAGNOSIS — I10 ESSENTIAL HYPERTENSION: ICD-10-CM

## 2023-01-18 RX ORDER — CYCLOBENZAPRINE HCL 5 MG
5 TABLET ORAL
Qty: 30 TABLET | Refills: 0 | OUTPATIENT
Start: 2023-01-18

## 2023-01-18 NOTE — TELEPHONE ENCOUNTER
"Routing refill request to provider for review/approval because:  Blood pressure out of range  Early refill request    Last Written Prescription Date:  12/16/2022  Last Fill Quantity: 90,  # refills: 0   Last office visit provider:  12/19/2022     Requested Prescriptions   Pending Prescriptions Disp Refills     lisinopril (ZESTRIL) 10 MG tablet 90 tablet 0     Sig: Take 2 tablets (20 mg) by mouth daily       ACE Inhibitors (Including Combos) Protocol Failed - 1/18/2023  2:57 PM        Failed - Blood pressure under 140/90 in past 12 months     BP Readings from Last 3 Encounters:   12/19/22 (!) 130/90   12/16/22 (!) 146/90   10/18/22 (!) 145/95                 Passed - Recent (12 mo) or future (30 days) visit within the authorizing provider's specialty     Patient has had an office visit with the authorizing provider or a provider within the authorizing providers department within the previous 12 mos or has a future within next 30 days. See \"Patient Info\" tab in inbasket, or \"Choose Columns\" in Meds & Orders section of the refill encounter.              Passed - Medication is active on med list        Passed - Patient is age 18 or older        Passed - Normal serum creatinine on file in past 12 months     Recent Labs   Lab Test 12/16/22  0950   CR 1.08       Ok to refill medication if creatinine is low          Passed - Normal serum potassium on file in past 12 months     Recent Labs   Lab Test 12/16/22  0950   POTASSIUM 4.3              Refused Prescriptions Disp Refills     cyclobenzaprine (FLEXERIL) 5 MG tablet 30 tablet 0     Sig: Take 1 tablet (5 mg) by mouth nightly as needed for muscle spasms       There is no refill protocol information for this order          Ashley Avery RN 01/18/23 3:00 PM  "

## 2023-01-18 NOTE — TELEPHONE ENCOUNTER
Refill request Refused - Duplicate    Cyclobenzaprine 5mg was refilled on 1/15/2023 - Disp 30, Ref 0              Ashley Avery RN  01/18/23 2:59 PM  [facility] Nurse Advisor

## 2023-01-19 RX ORDER — LISINOPRIL 20 MG/1
20 TABLET ORAL DAILY
Qty: 30 TABLET | Refills: 0 | Status: SHIPPED | OUTPATIENT
Start: 2023-01-19 | End: 2023-02-20

## 2023-02-18 DIAGNOSIS — I10 ESSENTIAL HYPERTENSION: ICD-10-CM

## 2023-02-19 NOTE — TELEPHONE ENCOUNTER
"Routing refill request to provider for review/approval because:  BP out of Range    Last Written Prescription Date:  1/19/23  Last Fill Quantity: 30,  # refills: 0   Last office visit provider:  12/19/22     Requested Prescriptions   Pending Prescriptions Disp Refills     lisinopril (ZESTRIL) 20 MG tablet [Pharmacy Med Name: LISINOPRIL 20 MG TABLET] 30 tablet 0     Sig: TAKE 1 TABLET BY MOUTH EVERY DAY       ACE Inhibitors (Including Combos) Protocol Failed - 2/18/2023  7:44 AM        Failed - Blood pressure under 140/90 in past 12 months     BP Readings from Last 3 Encounters:   12/19/22 (!) 130/90   12/16/22 (!) 146/90   10/18/22 (!) 145/95                 Passed - Recent (12 mo) or future (30 days) visit within the authorizing provider's specialty     Patient has had an office visit with the authorizing provider or a provider within the authorizing providers department within the previous 12 mos or has a future within next 30 days. See \"Patient Info\" tab in inbasket, or \"Choose Columns\" in Meds & Orders section of the refill encounter.              Passed - Medication is active on med list        Passed - Patient is age 18 or older        Passed - Normal serum creatinine on file in past 12 months     Recent Labs   Lab Test 12/16/22  0950   CR 1.08       Ok to refill medication if creatinine is low          Passed - Normal serum potassium on file in past 12 months     Recent Labs   Lab Test 12/16/22  0950   POTASSIUM 4.3                  Patricia Hector RN 02/19/23 10:49 AM  "

## 2023-02-20 RX ORDER — LISINOPRIL 20 MG/1
TABLET ORAL
Qty: 30 TABLET | Refills: 0 | Status: SHIPPED | OUTPATIENT
Start: 2023-02-20

## 2023-03-01 ENCOUNTER — OFFICE VISIT (OUTPATIENT)
Dept: FAMILY MEDICINE | Facility: CLINIC | Age: 53
End: 2023-03-01
Payer: COMMERCIAL

## 2023-03-01 VITALS
HEIGHT: 70 IN | BODY MASS INDEX: 34.24 KG/M2 | TEMPERATURE: 98.4 F | DIASTOLIC BLOOD PRESSURE: 70 MMHG | OXYGEN SATURATION: 100 % | RESPIRATION RATE: 18 BRPM | WEIGHT: 239.2 LBS | SYSTOLIC BLOOD PRESSURE: 104 MMHG | HEART RATE: 83 BPM

## 2023-03-01 DIAGNOSIS — E66.01 MORBID OBESITY (H): ICD-10-CM

## 2023-03-01 DIAGNOSIS — E11.40 TYPE 2 DIABETES MELLITUS WITH DIABETIC NEUROPATHY, WITHOUT LONG-TERM CURRENT USE OF INSULIN (H): ICD-10-CM

## 2023-03-01 DIAGNOSIS — E11.9 NEW ONSET TYPE 2 DIABETES MELLITUS (H): Primary | ICD-10-CM

## 2023-03-01 DIAGNOSIS — I10 ESSENTIAL HYPERTENSION: ICD-10-CM

## 2023-03-01 DIAGNOSIS — K21.00 GASTROESOPHAGEAL REFLUX DISEASE WITH ESOPHAGITIS WITHOUT HEMORRHAGE: ICD-10-CM

## 2023-03-01 LAB — HBA1C MFR BLD: 5 % (ref 0–5.6)

## 2023-03-01 PROCEDURE — 83036 HEMOGLOBIN GLYCOSYLATED A1C: CPT | Performed by: FAMILY MEDICINE

## 2023-03-01 PROCEDURE — 82043 UR ALBUMIN QUANTITATIVE: CPT | Performed by: FAMILY MEDICINE

## 2023-03-01 PROCEDURE — 99214 OFFICE O/P EST MOD 30 MIN: CPT | Performed by: FAMILY MEDICINE

## 2023-03-01 PROCEDURE — 36415 COLL VENOUS BLD VENIPUNCTURE: CPT | Performed by: FAMILY MEDICINE

## 2023-03-01 PROCEDURE — 80048 BASIC METABOLIC PNL TOTAL CA: CPT | Performed by: FAMILY MEDICINE

## 2023-03-01 PROCEDURE — 82570 ASSAY OF URINE CREATININE: CPT | Performed by: FAMILY MEDICINE

## 2023-03-01 RX ORDER — METFORMIN HCL 500 MG
500 TABLET, EXTENDED RELEASE 24 HR ORAL 2 TIMES DAILY WITH MEALS
Qty: 180 TABLET | Refills: 1 | Status: SHIPPED | OUTPATIENT
Start: 2023-03-01 | End: 2023-10-02

## 2023-03-01 ASSESSMENT — PAIN SCALES - GENERAL: PAINLEVEL: MILD PAIN (2)

## 2023-03-01 NOTE — PROGRESS NOTES
Assessment & Plan   Problem List Items Addressed This Visit        Nervous and Auditory    Type 2 diabetes mellitus with diabetic neuropathy, without long-term current use of insulin (H)    Relevant Medications    metFORMIN (GLUCOPHAGE XR) 500 MG 24 hr tablet       Digestive    Morbid obesity (H)    Relevant Medications    metFORMIN (GLUCOPHAGE XR) 500 MG 24 hr tablet       Circulatory    Essential hypertension   Other Visit Diagnoses     New onset type 2 diabetes mellitus (H)    -  Primary    Relevant Medications    metFORMIN (GLUCOPHAGE XR) 500 MG 24 hr tablet    Other Relevant Orders    Albumin Random Urine Quantitative with Creat Ratio (Completed)    Adult Eye  Referral    Hemoglobin A1c (Completed)    AMB Adult Diabetes Educator Referral    Basic metabolic panel (Completed)    Gastroesophageal reflux disease with esophagitis without hemorrhage        Relevant Medications    omeprazole (PRILOSEC) 20 MG DR capsule              No follow-ups on file.   Follow-up Visit   Expected date:  Jun 01, 2023 (Approximate)      Follow Up Appointment Details:     Follow-up with whom?: Me    Follow-Up for what?: Chronic Disease f/u Comment - DM    Chronic Disease f/u: Diabetes    How?: In Person or Virtual    Is this an as-needed follow-up?: No                    WALTER GORDON MD  [facility]    Madison Contreras is a 52 year old, presenting for the following health issues:  Establish Care (Establish care. ) and Diabetes (Follow up from visit with Dr. Cesar. Pt does not check blood sugar at home because he was never instructed to. Is okay with blood tests if needed today. )      New diagnosis of diabetes in December (2+ months ago).   On metformin 500 BID        History of Present Illness       Diabetes:   He presents for follow up of diabetes.  He is not checking blood glucose. He is concerned about other.  He is having numbness in feet and burning in feet. The patient has not had a  "diabetic eye exam in the last 12 months.         Hypertension: He presents for follow up of hypertension.  He does not check blood pressure  regularly outside of the clinic. Outpatient blood pressures have not been over 140/90. He follows a low salt diet.     He eats 2-3 servings of fruits and vegetables daily.He consumes 0 sweetened beverage(s) daily.He exercises with enough effort to increase his heart rate 30 to 60 minutes per day.  He exercises with enough effort to increase his heart rate 4 days per week.   He is taking medications regularly.            Objective    /70 (BP Location: Left arm, Patient Position: Sitting, Cuff Size: Adult Large)   Pulse 83   Temp 98.4  F (36.9  C) (Oral)   Resp 18   Ht 1.778 m (5' 10\")   Wt 108.5 kg (239 lb 3.2 oz)   SpO2 100%   BMI 34.32 kg/m    Body mass index is 34.32 kg/m .  Physical Exam   GENERAL: healthy, alert and no distress  NECK: no adenopathy, no asymmetry, masses, or scars and thyroid normal to palpation  RESP: lungs clear to auscultation - no rales, rhonchi or wheezes  CV: regular rate and rhythm, normal S1 S2, no S3 or S4, no murmur, click or rub, no peripheral edema and peripheral pulses strong  ABDOMEN: soft, nontender, no hepatosplenomegaly, no masses and bowel sounds normal  MS: no gross musculoskeletal defects noted, no edema                    "

## 2023-03-02 LAB
ANION GAP SERPL CALCULATED.3IONS-SCNC: 12 MMOL/L (ref 7–15)
BUN SERPL-MCNC: 14.8 MG/DL (ref 6–20)
CALCIUM SERPL-MCNC: 9.4 MG/DL (ref 8.6–10)
CHLORIDE SERPL-SCNC: 105 MMOL/L (ref 98–107)
CREAT SERPL-MCNC: 1.06 MG/DL (ref 0.67–1.17)
CREAT UR-MCNC: 232 MG/DL
DEPRECATED HCO3 PLAS-SCNC: 21 MMOL/L (ref 22–29)
GFR SERPL CREATININE-BSD FRML MDRD: 84 ML/MIN/1.73M2
GLUCOSE SERPL-MCNC: 90 MG/DL (ref 70–99)
MICROALBUMIN UR-MCNC: <12 MG/L
MICROALBUMIN/CREAT UR: NORMAL MG/G{CREAT}
POTASSIUM SERPL-SCNC: 3.8 MMOL/L (ref 3.4–5.3)
SODIUM SERPL-SCNC: 138 MMOL/L (ref 136–145)

## 2023-03-06 ENCOUNTER — TELEPHONE (OUTPATIENT)
Dept: SLEEP MEDICINE | Facility: CLINIC | Age: 53
End: 2023-03-06
Payer: COMMERCIAL

## 2023-03-06 NOTE — TELEPHONE ENCOUNTER
Phone call made to patient for name of place where he had his previous sleep study. Patient states he can't remember. Patient states it he  device and did the test at home. It was 15 years ago in [place]. He states he will find out the name of place from his wife. Patient informed to complete release of information sent in order to obtain previous sleep study.     Nayely Bobo [facility]

## 2023-03-21 ENCOUNTER — ALLIED HEALTH/NURSE VISIT (OUTPATIENT)
Dept: EDUCATION SERVICES | Facility: CLINIC | Age: 53
End: 2023-03-21
Payer: COMMERCIAL

## 2023-03-21 DIAGNOSIS — E11.9 NEW ONSET TYPE 2 DIABETES MELLITUS (H): ICD-10-CM

## 2023-03-21 PROCEDURE — G0108 DIAB MANAGE TRN  PER INDIV: HCPCS | Performed by: REGISTERED NURSE

## 2023-03-21 RX ORDER — BLOOD SUGAR DIAGNOSTIC
STRIP MISCELLANEOUS
Qty: 100 STRIP | Refills: 4 | Status: SHIPPED | OUTPATIENT
Start: 2023-03-21

## 2023-03-21 RX ORDER — LANCETS
EACH MISCELLANEOUS
Qty: 100 EACH | Refills: 4 | Status: SHIPPED | OUTPATIENT
Start: 2023-03-21

## 2023-03-21 NOTE — PROGRESS NOTES
Diabetes Self-Management Education & Support  Type of Service: In Person Visit    ASSESSMENT:  Initial visit for newly diagnosed type 2 diabetes.  Ben has made significant lifestyle changes since he was diagnosed with diabetes.  He stopped drinking sugar soda, stop snacking, and is eating smaller portions.  Ben has lost 40 pounds since mid December.  His personal weight goal is to weigh 215 to 220 pounds.  Ultimately he would like to get off metformin extended release.  Demonstrated how to check his blood sugar at home.  Blood sugar in the office is 109 (2 hours after breakfast).  Ben works 2 jobs.  He has been waiting to eat dinner until he gets home at 11 PM.  Encouraged him to eat something or drink a Premier protein meal replacement between 5 and 6 PM to help stabilize appetite and blood sugar.  Encouraged him to mix up what he is eating and his exercise routine to help with continued weight loss.    Patient's most recent lab results  Hemoglobin A1C   Date Value Ref Range Status   03/01/2023 5.0 0.0 - 5.6 % Final     Comment:     Normal <5.7%   Prediabetes 5.7-6.4%    Diabetes 6.5% or higher     Note: Adopted from ADA consensus guidelines.   12/16/2022 8.6 (H) 0.0 - 5.6 % Final     Comment:     Normal <5.7%   Prediabetes 5.7-6.4%    Diabetes 6.5% or higher     Note: Adopted from ADA consensus guidelines.   is meeting goal of <7.0    Diabetes knowledge and skills assessment:   Patient is knowledgeable in diabetes management concepts related to: Healthy Eating and Taking Medication    Continue education with the following diabetes management concepts: Being Active, Problem Solving, Reducing Risks, Healthy Coping and Assistance making lifestyle changes    Based on learning assessment above, most appropriate setting for further diabetes education would be: Individual setting.      PLAN  See patient instructions.  See Care Plan for co-developed, patient-state behavior change goals.  AVS provided for patient  "today.    Education Materials Provided:  [employer] Understanding Diabetes Booklet, My Plate Planner, Accu-Chek Guide Me meter kit and Healthy snack list, American diabetes Association diagnosing diabetes, American diabetes Association A1c     SUBJECTIVE/OBJECTIVE:  Presents for: Initial Assessment for new diagnosis  Accompanied by: Self  Diabetes education in the past 24mo: No  Focus of Visit: Monitoring  Diabetes type: Type 2  Date of diagnosis: 12/16/22  Disease course: Improving  How confident are you filling out medical forms by yourself:: Extremely  Diabetes management related comments/concerns: none  Transportation concerns: No  Difficulty affording diabetes medication?: No  Other concerns:: None  Cultural Influences/Ethnic Background:  Not  or     Diabetes Symptoms & Complications:  Fatigue: Sometimes  Neuropathy: No  Polydipsia: No  Polyphagia: No  Polyuria: Sometimes  Visual change: Yes  Slow healing wounds: Sometimes  Symptom course: Improving  Weight trend: Decreasing  Complications assessed today?: Yes  Autonomic neuropathy: No  CVA: No  Heart disease: No  Nephropathy: No  Peripheral neuropathy: Other  Peripheral Vascular Disease: Other  Retinopathy: Other  Sexual dysfunction: Other    Patient Problem List and Family Medical History reviewed for relevant medical history, current medical status, and diabetes risk factors.    Vitals:  There were no vitals taken for this visit.  Estimated body mass index is 34.32 kg/m  as calculated from the following:    Height as of 3/1/23: 1.778 m (5' 10\").    Weight as of 3/1/23: 108.5 kg (239 lb 3.2 oz).   Last 3 BP:   BP Readings from Last 3 Encounters:   03/01/23 104/70   12/19/22 (!) 130/90   12/16/22 (!) 146/90       History   Smoking Status     Never   Smokeless Tobacco     Never       Labs:  Lab Results   Component Value Date    A1C 5.0 03/01/2023     Lab Results   Component Value Date    GLC 90 03/01/2023    GLC 96 08/28/2014     Lab " Results   Component Value Date    LDL 75 12/16/2022     Direct Measure HDL   Date Value Ref Range Status   12/16/2022 32 (L) >=40 mg/dL Final   ]  GFR Estimate   Date Value Ref Range Status   03/01/2023 84 >60 mL/min/1.73m2 Final     Comment:     eGFR calculated using 2021 CKD-EPI equation.   08/28/2014 >90  Non  GFR Calc   >60 mL/min/1.7m2 Final     GFR Estimate If Black   Date Value Ref Range Status   08/28/2014 >90   GFR Calc   >60 mL/min/1.7m2 Final     Lab Results   Component Value Date    CR 1.06 03/01/2023    CR 0.87 08/28/2014     No results found for: MICROALBUMIN    Healthy Eating:  Healthy Eating Assessed Today: Yes  Cultural/Gnosticism diet restrictions?: No  Meal planning/habits: Avoiding sweets, Low carb, Low salt, Smaller portions  How many times a week on average do you eat food made away from home (restaurant/take-out)?: 1  Meals include: Breakfast, Lunch  Breakfast: 9:00-9:30 pm Cottage cheese, blackberries, smoked turkey. Diet soda  Lunch: 12:30 pm Salad with chicken, tomatoes, cucumbers and ranch dressing  Dinner: 11:00 PM after work Chicken sandwich on whole wheat bread  Snacks: none  Other: Working 2 jobs. 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 : (P) Moderate (like brisk walking)  Exercise Minutes per Week: (P) 60  Barrier to exercise: None    Monitoring:  Blood Glucose Meter: Other  Times checking blood sugar at home (number): Never    Taking Medications:  Diabetes Medication(s)     Biguanides       metFORMIN (GLUCOPHAGE XR) 500 MG 24 hr tablet    Take 1 tablet (500 mg) by mouth 2 times daily (with  meals)        Taking Medication Assessed Today: Yes  Current Treatments: Diet, Oral Medication (taken by mouth)  Problems taking diabetes medications regularly?: No  Diabetes medication side effects?: No    Problem Solving:  Problem Solving Assessed Today: Yes  Is the patient at risk for hypoglycemia?: No  Is the patient at risk for DKA?: No    Reducing Risks:  Reducing Risks Assessed Today: Yes  Diabetes Risks: Age over 45 years, Family History  CAD Risks: Diabetes Mellitus, Male sex, Obesity  Has dilated eye exam at least once a year?: No  Sees dentist every 6 months?: No  Feet checked by healthcare provider in the last year?: No    Healthy Coping:  Healthy Coping Assessed Today: Yes  Emotional response to diabetes: Ready to learn  Informal Support system:: Family, Friends, Spouse  Stage of change: ACTION (Actively working towards change)  Support resources: Websites  Patient Activation Measure Survey Score:  No flowsheet data found.    Care Plan and Education Provided:  Care Plan: Diabetes   Updates made by Mally Oshea RD since 3/21/2023 12:00 AM      Problem: HbA1C Not In Goal       Goal: Establish Regular Follow-Ups with PCP       Task: Discuss with PCP the recommended timing for patient's next follow up visit(s)    Responsible User: Mally Oshea RD      Task: Discuss schedule for PCP visits with patient    Responsible User: Mally Oshea RD      Goal: Get HbA1C Level in Goal       Task: Educate patient on diabetes education self-management topics    Responsible User: Mally Oshea RD      Task: Educate patient on benefits of regular glucose monitoring Completed 3/21/2023   Responsible User: Mally Oshea RD      Task: Refer patient to appropriate extended care team member, as needed (Medication Therapy Management, Behavioral Health, Physical Therapy, etc.)    Responsible User: Mally Oshea RD      Task: Discuss diabetes treatment plan with patient    Responsible User: Mally Oshea RD       Problem: Diabetes Self-Management Education Needed to Optimize Self-Care Behaviors       Goal: Understand diabetes pathophysiology and disease progression       Task: Provide education on diabetes pathophysiology and disease progression specfic to patient's diabetes type Completed 3/21/2023   Responsible User: Mally Oshea RD      Goal: Healthy Eating - follow a healthy eating pattern for diabetes       Task: Provide education on portion control and consistency in amount, composition and timing of food intake    Responsible User: Mally Oshea RD      Task: Provide education on managing carbohydrate intake (carbohydrate counting, plate planning method, etc.) Completed 3/21/2023   Responsible User: Mally Oshea RD      Task: Provide education on weight management Completed 3/21/2023   Responsible User: Mally Oshea RD      Task: Provide education on heart healthy eating    Responsible User: Mally Oshea RD      Task: Provide education on eating out    Responsible User: Mally Oshea RD      Task: Develop individualized healthy eating plan with patient    Responsible User: Mally Oshea RD      Goal: Being Active - get regular physical activity, working up to at least 150 minutes per week       Task: Provide education on relationship of activity to glucose and precautions to take if at risk for low glucose Completed 3/21/2023   Responsible User: Mally Oshea RD      Task: Discuss barriers to physical activity with patient    Responsible User: Mally Oshea RD      Task: Develop physical activity plan with patient    Responsible User: Mally Oshea RD      Task: Explore community resources including walking groups, assistance programs, and home videos    Responsible User: Mally Oshea RD      Goal: Monitoring - monitor glucose as directed    This Visit's Progress: 0%      Task: Provide education on blood glucose monitoring (purpose, proper technique, frequency, glucose  targets, interpreting results, when to use glucose control solution, sharps disposal) Completed 3/21/2023   Responsible User: Mally Oshea RD      Task: Provide education on continuous glucose monitoring (sensor placement, use of eh or /reader, understanding glucose trends, alerts and alarms, differences between sensor glucose and blood glucose)    Responsible User: Mally Oshea RD      Task: Provide education on ketone monitoring (when to monitor, frequency, etc.)    Responsible User: Mally Oshea RD      Goal: Taking Medication - patient is consistently taking medications as directed    This Visit's Progress: 100%      Task: Provide education on action of prescribed medication, including when to take and possible side effects Completed 3/21/2023   Responsible User: Mally Oshea RD      Task: Provide education on insulin and injectable diabetes medications, including administration, storage, site selection and rotation for injection sites    Responsible User: Mally Oshea RD      Task: Discuss barriers to medication adherence with patient and provide management technique ideas as appropriate    Responsible User: Mally Oshea RD      Task: Provide education on frequency and refill details of medications    Responsible User: Mally Oshea RD      Goal: Problem Solving - know how to prevent and manage short-term diabetes complications       Task: Provide education on high blood glucose - causes, signs/symptoms, prevention and treatment Completed 3/21/2023   Responsible User: Mally Oshea RD      Task: Provide education on low blood glucose - causes, signs/symptoms, prevention, treatment, carrying a carbohydrate source at all times, and medical identification    Responsible User: Mally Oshea RD      Task: Provide education on safe travel with diabetes    Responsible User: Mally Oshea RD      Task: Provide education on how to care for diabetes on sick days    Responsible  User: Mally Oshea RD      Task: Provide education on when to call a health care provider    Responsible User: Mally Oshea RD      Goal: Reducing Risks - know how to prevent and treat long-term diabetes complications       Task: Provide education on major complications of diabetes, prevention, early diagnostic measures and treatment of complications    Responsible User: Mally Oshea RD      Task: Provide education on recommended care for dental, eye and foot health    Responsible User: Mally Oshea RD      Task: Provide education on Hemoglobin A1c - goals and relationship to blood glucose levels    Responsible User: Mally Oshea RD      Task: Provide education on recommendations for heart health - lipid levels and goals, blood pressure and goals, and aspirin therapy, if indicated    Responsible User: Mally Oshea RD      Task: Provide education on tobacco cessation    Responsible User: Mally Oshea RD      Goal: Healthy Coping - use available resources to cope with the challenges of managing diabetes       Task: Discuss recognizing feelings about having diabetes    Responsible User: Mally Oshea RD      Task: Provide education on the benefits of making appropriate lifestyle changes    Responsible User: Mally Oshea RD      Task: Provide education on benefits of utilizing support systems    Responsible User: Mally Oshea RD      Task: Discuss methods for coping with stress    Responsible User: Mally Oshea RD      Task: Provide education on when to seek professional counseling    Responsible User: Mally Oshea RD        Diabetes self-management training ()  Time Spent: 60 minutes  Encounter Type: Individual    Any diabetes medication dose changes were made via the CDE Protocol per the patient's primary care provider. A copy of this encounter was shared with the provider.

## 2023-03-21 NOTE — PATIENT INSTRUCTIONS
1. Test blood sugar 2 times per day. Key time would be before and 2 hours after any meal or before breakfast and bedtime.  2. Eat smaller amounts more often. Avoid skipping meals.  3. Include protein at meals and snacks.   4. Avoid sugary drinks (regular soda, lemonade, sweetened tea and Gatorade).  5. Eat fresh fruit instead of juice.  6. Include high fiber, whole grain foods instead of processed white foods. Healthier choices are whole grain cereals, whole wheat pasta, brown or wild rice and whole wheat bread.   7. Aim for foods with 3 grams of fiber or more per serving. Limit added sugar to 36 grams of added sugar per day.  8. Stay active every day; try not to sit for more than 30 minutes and walk 20-30 minutes most days of the week.   9. Bring meter and blood sugar log to next visit in 1 month.    Blood sugar targets:   Before meals:   1-2 hours after meals: less 180  Bedtime:     A1c target of less than 7.0% (estimated average blood sugar of 154 on your meter)

## 2023-03-21 NOTE — LETTER
3/21/2023         RE: Ben Zarate  [address] [place] [address]        Dear Colleague,    Thank you for referring your patient, Ben Zarate, to the [facility]. Please see a copy of my visit note below.    Diabetes Self-Management Education & Support  Type of Service: In Person Visit    ASSESSMENT:  Initial visit for newly diagnosed type 2 diabetes.  Ben has made significant lifestyle changes since he was diagnosed with diabetes.  He stopped drinking sugar soda, stop snacking, and is eating smaller portions.  Ben has lost 40 pounds since mid December.  His personal weight goal is to weigh 215 to 220 pounds.  Ultimately he would like to get off metformin extended release.  Demonstrated how to check his blood sugar at home.  Blood sugar in the office is 109 (2 hours after breakfast).  Ben works 2 jobs.  He has been waiting to eat dinner until he gets home at 11 PM.  Encouraged him to eat something or drink a Premier protein meal replacement between 5 and 6 PM to help stabilize appetite and blood sugar.  Encouraged him to mix up what he is eating and his exercise routine to help with continued weight loss.    Patient's most recent lab results  Hemoglobin A1C   Date Value Ref Range Status   03/01/2023 5.0 0.0 - 5.6 % Final     Comment:     Normal <5.7%   Prediabetes 5.7-6.4%    Diabetes 6.5% or higher     Note: Adopted from ADA consensus guidelines.   12/16/2022 8.6 (H) 0.0 - 5.6 % Final     Comment:     Normal <5.7%   Prediabetes 5.7-6.4%    Diabetes 6.5% or higher     Note: Adopted from ADA consensus guidelines.   is meeting goal of <7.0    Diabetes knowledge and skills assessment:   Patient is knowledgeable in diabetes management concepts related to: Healthy Eating and Taking Medication    Continue education with the following diabetes management concepts: Being Active, Problem Solving, Reducing Risks, Healthy Coping and Assistance making lifestyle  "changes    Based on learning assessment above, most appropriate setting for further diabetes education would be: Individual setting.      PLAN  See patient instructions.  See Care Plan for co-developed, patient-state behavior change goals.  AVS provided for patient today.    Education Materials Provided:  [employer] Understanding Diabetes Booklet, My Plate Planner, Accu-Chek Guide Me meter kit and Healthy snack list, American diabetes Association diagnosing diabetes, American diabetes Association A1c     SUBJECTIVE/OBJECTIVE:  Presents for: Initial Assessment for new diagnosis  Accompanied by: Self  Diabetes education in the past 24mo: No  Focus of Visit: Monitoring  Diabetes type: Type 2  Date of diagnosis: 12/16/22  Disease course: Improving  How confident are you filling out medical forms by yourself:: Extremely  Diabetes management related comments/concerns: none  Transportation concerns: No  Difficulty affording diabetes medication?: No  Other concerns:: None  Cultural Influences/Ethnic Background:  Not  or     Diabetes Symptoms & Complications:  Fatigue: Sometimes  Neuropathy: No  Polydipsia: No  Polyphagia: No  Polyuria: Sometimes  Visual change: Yes  Slow healing wounds: Sometimes  Symptom course: Improving  Weight trend: Decreasing  Complications assessed today?: Yes  Autonomic neuropathy: No  CVA: No  Heart disease: No  Nephropathy: No  Peripheral neuropathy: Other  Peripheral Vascular Disease: Other  Retinopathy: Other  Sexual dysfunction: Other    Patient Problem List and Family Medical History reviewed for relevant medical history, current medical status, and diabetes risk factors.    Vitals:  There were no vitals taken for this visit.  Estimated body mass index is 34.32 kg/m  as calculated from the following:    Height as of 3/1/23: 1.778 m (5' 10\").    Weight as of 3/1/23: 108.5 kg (239 lb 3.2 oz).   Last 3 BP:   BP Readings from Last 3 Encounters:   03/01/23 104/70   12/19/22 (!) " 130/90   12/16/22 (!) 146/90       History   Smoking Status     Never   Smokeless Tobacco     Never       Labs:  Lab Results   Component Value Date    A1C 5.0 03/01/2023     Lab Results   Component Value Date    GLC 90 03/01/2023    GLC 96 08/28/2014     Lab Results   Component Value Date    LDL 75 12/16/2022     Direct Measure HDL   Date Value Ref Range Status   12/16/2022 32 (L) >=40 mg/dL Final   ]  GFR Estimate   Date Value Ref Range Status   03/01/2023 84 >60 mL/min/1.73m2 Final     Comment:     eGFR calculated using 2021 CKD-EPI equation.   08/28/2014 >90  Non  GFR Calc   >60 mL/min/1.7m2 Final     GFR Estimate If Black   Date Value Ref Range Status   08/28/2014 >90   GFR Calc   >60 mL/min/1.7m2 Final     Lab Results   Component Value Date    CR 1.06 03/01/2023    CR 0.87 08/28/2014     No results found for: MICROALBUMIN    Healthy Eating:  Healthy Eating Assessed Today: Yes  Cultural/Moravian diet restrictions?: No  Meal planning/habits: Avoiding sweets, Low carb, Low salt, Smaller portions  How many times a week on average do you eat food made away from home (restaurant/take-out)?: 1  Meals include: Breakfast, Lunch  Breakfast: 9:00-9:30 pm Cottage cheese, blackberries, smoked turkey. Diet soda  Lunch: 12:30 pm Salad with chicken, tomatoes, cucumbers and ranch dressing  Dinner: 11:00 PM after work Chicken sandwich on whole wheat bread  Snacks: none  Other: Working 2 jobs. 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 : (P) Moderate (like brisk walking)  Exercise Minutes per Week: (P) 60  Barrier to exercise:  None    Monitoring:  Blood Glucose Meter: Other  Times checking blood sugar at home (number): Never    Taking Medications:  Diabetes Medication(s)     Biguanides       metFORMIN (GLUCOPHAGE XR) 500 MG 24 hr tablet    Take 1 tablet (500 mg) by mouth 2 times daily (with meals)        Taking Medication Assessed Today: Yes  Current Treatments: Diet, Oral Medication (taken by mouth)  Problems taking diabetes medications regularly?: No  Diabetes medication side effects?: No    Problem Solving:  Problem Solving Assessed Today: Yes  Is the patient at risk for hypoglycemia?: No  Is the patient at risk for DKA?: No    Reducing Risks:  Reducing Risks Assessed Today: Yes  Diabetes Risks: Age over 45 years, Family History  CAD Risks: Diabetes Mellitus, Male sex, Obesity  Has dilated eye exam at least once a year?: No  Sees dentist every 6 months?: No  Feet checked by healthcare provider in the last year?: No    Healthy Coping:  Healthy Coping Assessed Today: Yes  Emotional response to diabetes: Ready to learn  Informal Support system:: Family, Friends, Spouse  Stage of change: ACTION (Actively working towards change)  Support resources: Websites  Patient Activation Measure Survey Score:  No flowsheet data found.    Care Plan and Education Provided:  Care Plan: Diabetes   Updates made by Mally Oshea RD since 3/21/2023 12:00 AM      Problem: HbA1C Not In Goal       Goal: Establish Regular Follow-Ups with PCP       Task: Discuss with PCP the recommended timing for patient's next follow up visit(s)    Responsible User: Mally Oshea RD      Task: Discuss schedule for PCP visits with patient    Responsible User: Mally Oshea RD      Goal: Get HbA1C Level in Goal       Task: Educate patient on diabetes education self-management topics    Responsible User: Mally Oshea RD      Task: Educate patient on benefits of regular glucose monitoring Completed 3/21/2023   Responsible User: Mally Oshea RD      Task: Refer  patient to appropriate extended care team member, as needed (Medication Therapy Management, Behavioral Health, Physical Therapy, etc.)    Responsible User: Mally Oshea RD      Task: Discuss diabetes treatment plan with patient    Responsible User: Mally Oshea RD      Problem: Diabetes Self-Management Education Needed to Optimize Self-Care Behaviors       Goal: Understand diabetes pathophysiology and disease progression       Task: Provide education on diabetes pathophysiology and disease progression specfic to patient's diabetes type Completed 3/21/2023   Responsible User: Mally Oshea RD      Goal: Healthy Eating - follow a healthy eating pattern for diabetes       Task: Provide education on portion control and consistency in amount, composition and timing of food intake    Responsible User: Mally Oshea RD      Task: Provide education on managing carbohydrate intake (carbohydrate counting, plate planning method, etc.) Completed 3/21/2023   Responsible User: Mally Oshea RD      Task: Provide education on weight management Completed 3/21/2023   Responsible User: Mally Oshea RD      Task: Provide education on heart healthy eating    Responsible User: Mally Oshea RD      Task: Provide education on eating out    Responsible User: Mally Oshea RD      Task: Develop individualized healthy eating plan with patient    Responsible User: Mally Oshea RD      Goal: Being Active - get regular physical activity, working up to at least 150 minutes per week       Task: Provide education on relationship of activity to glucose and precautions to take if at risk for low glucose Completed 3/21/2023   Responsible User: Mally Oshea RD      Task: Discuss barriers to physical activity with patient    Responsible User: Mally Oshea RD      Task: Develop physical activity plan with patient    Responsible User: Mally Oshea RD      Task: Explore community resources including walking  groups, assistance programs, and home videos    Responsible User: Mally Oshea RD      Goal: Monitoring - monitor glucose as directed    This Visit's Progress: 0%      Task: Provide education on blood glucose monitoring (purpose, proper technique, frequency, glucose targets, interpreting results, when to use glucose control solution, sharps disposal) Completed 3/21/2023   Responsible User: Mally Oshea RD      Task: Provide education on continuous glucose monitoring (sensor placement, use of eh or /reader, understanding glucose trends, alerts and alarms, differences between sensor glucose and blood glucose)    Responsible User: Mally Oshea RD      Task: Provide education on ketone monitoring (when to monitor, frequency, etc.)    Responsible User: Mally Oshea RD      Goal: Taking Medication - patient is consistently taking medications as directed    This Visit's Progress: 100%      Task: Provide education on action of prescribed medication, including when to take and possible side effects Completed 3/21/2023   Responsible User: Mally Oshea RD      Task: Provide education on insulin and injectable diabetes medications, including administration, storage, site selection and rotation for injection sites    Responsible User: Mally Oshea RD      Task: Discuss barriers to medication adherence with patient and provide management technique ideas as appropriate    Responsible User: Mally Oshea RD      Task: Provide education on frequency and refill details of medications    Responsible User: Mally Oshea RD      Goal: Problem Solving - know how to prevent and manage short-term diabetes complications       Task: Provide education on high blood glucose - causes, signs/symptoms, prevention and treatment Completed 3/21/2023   Responsible User: Mally Oshea RD      Task: Provide education on low blood glucose - causes, signs/symptoms, prevention, treatment, carrying a carbohydrate  source at all times, and medical identification    Responsible User: Mally Oshea RD      Task: Provide education on safe travel with diabetes    Responsible User: Mally Oshea RD      Task: Provide education on how to care for diabetes on sick days    Responsible User: Mally Oshea RD      Task: Provide education on when to call a health care provider    Responsible User: Mally Oshea RD      Goal: Reducing Risks - know how to prevent and treat long-term diabetes complications       Task: Provide education on major complications of diabetes, prevention, early diagnostic measures and treatment of complications    Responsible User: Mally Oshea RD      Task: Provide education on recommended care for dental, eye and foot health    Responsible User: Mally Oshea RD      Task: Provide education on Hemoglobin A1c - goals and relationship to blood glucose levels    Responsible User: Mally Oshea RD      Task: Provide education on recommendations for heart health - lipid levels and goals, blood pressure and goals, and aspirin therapy, if indicated    Responsible User: Mally Oshea RD      Task: Provide education on tobacco cessation    Responsible User: Mally Oshea RD      Goal: Healthy Coping - use available resources to cope with the challenges of managing diabetes       Task: Discuss recognizing feelings about having diabetes    Responsible User: Mally Oshea RD      Task: Provide education on the benefits of making appropriate lifestyle changes    Responsible User: Mally Oshea RD      Task: Provide education on benefits of utilizing support systems    Responsible User: Mally Oshea RD      Task: Discuss methods for coping with stress    Responsible User: Mally Oshea RD      Task: Provide education on when to seek professional counseling    Responsible User: Mally Oshea RD        Diabetes self-management training ()  Time Spent: 60 minutes  Encounter Type:  Individual    Any diabetes medication dose changes were made via the CDE Protocol per the patient's primary care provider. A copy of this encounter was shared with the provider.

## 2023-04-20 ENCOUNTER — ALLIED HEALTH/NURSE VISIT (OUTPATIENT)
Dept: EDUCATION SERVICES | Facility: CLINIC | Age: 53
End: 2023-04-20
Payer: COMMERCIAL

## 2023-04-20 VITALS — WEIGHT: 244.4 LBS | BODY MASS INDEX: 35.07 KG/M2

## 2023-04-20 DIAGNOSIS — E11.9 NEW ONSET TYPE 2 DIABETES MELLITUS (H): Primary | ICD-10-CM

## 2023-04-20 PROCEDURE — G0108 DIAB MANAGE TRN  PER INDIV: HCPCS | Performed by: REGISTERED NURSE

## 2023-04-20 NOTE — PROGRESS NOTES
Diabetes Self-Management Education & Support  Type of Service: In Person Visit    ASSESSMENT:  1 month follow-up for newly diagnosed type 2 diabetes.  Ben is checking his blood sugar twice per day.  All blood sugars are within target.  Patient continues to lose weight.  Instructed on preventing diabetes complications, foot care, sick days and the importance of routine diabetes checks.    Patient's most recent   Lab Results   Component Value Date    A1C 5.0 03/01/2023     is meeting goal of <7.0    Diabetes knowledge and skills assessment:   Patient is knowledgeable in diabetes management concepts related to: Healthy Eating, Being Active, Monitoring, Taking Medication, Problem Solving, Reducing Risks and Healthy Coping    Continue education with the following diabetes management concepts: Problem solving.    Based on learning assessment above, most appropriate setting for further diabetes education would be: Individual setting.      PLAN  See patient instructions.  See Care Plan for co-developed, patient-state behavior change goals.  AVS provided for patient today.    Education Materials Provided:  Preventing diabetes complications  Foot care tips  Sick day management  Symptoms and treatment of high and low blood sugars  American diabetes Association factors that raise and lower blood sugar    SUBJECTIVE/OBJECTIVE:  Presents for: Follow-up  Accompanied by: Self  Diabetes education in the past 24mo: Yes  Focus of Visit: Monitoring  Diabetes type: Type 2  Date of diagnosis: 12/16/22  Disease course: Improving  How confident are you filling out medical forms by yourself:: Extremely  Diabetes management related comments/concerns: none  Transportation concerns: No  Difficulty affording diabetes medication?: No  Other concerns:: None  Cultural Influences/Ethnic Background:  Not  or     Diabetes Symptoms & Complications:  Fatigue: Sometimes  Neuropathy: No  Polydipsia: No  Polyphagia: No  Polyuria: No  Visual  "change: No  Slow healing wounds: No  Symptom course: Improving  Weight trend: Fluctuating  Complications assessed today?: Yes  Autonomic neuropathy: No  CVA: No  Heart disease: No  Nephropathy: No  Peripheral neuropathy: Other  Peripheral Vascular Disease: Other  Retinopathy: Other  Sexual dysfunction: Other    Patient Problem List and Family Medical History reviewed for relevant medical history, current medical status, and diabetes risk factors.    Vitals:  Wt 110.9 kg (244 lb 6.4 oz)   BMI 35.07 kg/m    Estimated body mass index is 35.07 kg/m  as calculated from the following:    Height as of 3/1/23: 1.778 m (5' 10\").    Weight as of this encounter: 110.9 kg (244 lb 6.4 oz).   Last 3 BP:   BP Readings from Last 3 Encounters:   03/01/23 104/70   12/19/22 (!) 130/90   12/16/22 (!) 146/90       History   Smoking Status     Never   Smokeless Tobacco     Never       Labs:  Lab Results   Component Value Date    A1C 5.0 03/01/2023     Lab Results   Component Value Date    GLC 90 03/01/2023    GLC 96 08/28/2014     Lab Results   Component Value Date    LDL 75 12/16/2022     Direct Measure HDL   Date Value Ref Range Status   12/16/2022 32 (L) >=40 mg/dL Final   ]  GFR Estimate   Date Value Ref Range Status   03/01/2023 84 >60 mL/min/1.73m2 Final     Comment:     eGFR calculated using 2021 CKD-EPI equation.   08/28/2014 >90  Non  GFR Calc   >60 mL/min/1.7m2 Final     GFR Estimate If Black   Date Value Ref Range Status   08/28/2014 >90   GFR Calc   >60 mL/min/1.7m2 Final     Lab Results   Component Value Date    CR 1.06 03/01/2023    CR 0.87 08/28/2014     No results found for: MICROALBUMIN    Healthy Eating:  Healthy Eating Assessed Today: Yes  Cultural/Catholic diet restrictions?: No  Meal planning/habits: Avoiding sweets, Low carb, Low salt, Smaller portions  How many times a week on average do you eat food made away from home (restaurant/take-out)?: 1  Meals include: Breakfast, " Lunch  Breakfast: 9:00-9:30 pm Cottage cheese, blackberries, smoked turkey. Diet soda  Lunch: 12:30 pm Salad with chicken, tomatoes, cucumbers and ranch dressing  Dinner: 11:00 PM after work Chicken sandwich on whole wheat bread  Snacks: none  Other: Working 2 jobs. 8:30-5 and 6-11:00 PM  Beverages: Water, Diet soda, Milk  Has patient met with a dietitian in the past?: Yes    Being Active:  Being Active Assessed Today: Yes  Exercise:: Yes (2nd job active from 6-11:00 pm. [place]  Days per week of moderate to strenuous exercise (like a brisk walk): 3  On average, minutes per day of exercise at this level: 20  How intense was your typical exercise? : Moderate (like brisk walking)  Exercise Minutes per Week: 60  Barrier to exercise: None    Monitoring:  Monitoring Assessed Today: Yes  Did patient bring glucose meter to appointment? : No  Blood Glucose Meter: Accu-chek  Times checking blood sugar at home (number): 2  Times checking blood sugar at home (per): Day  Blood glucose trend: Decreasing    Taking Medications:  Diabetes Medication(s)     Biguanides       metFORMIN (GLUCOPHAGE XR) 500 MG 24 hr tablet    Take 1 tablet (500 mg) by mouth 2 times daily (with meals)        Taking Medication Assessed Today: Yes  Current Treatments: Diet, Oral Medication (taken by mouth)  Problems taking diabetes medications regularly?: No  Diabetes medication side effects?: No    Problem Solving:  Problem Solving Assessed Today: Yes  Is the patient at risk for hypoglycemia?: No  Is the patient at risk for DKA?: No    Reducing Risks:  Reducing Risks Assessed Today: Yes  Diabetes Risks: Age over 45 years, Family History  CAD Risks: Diabetes Mellitus, Male sex, Obesity  Has dilated eye exam at least once a year?: Yes  Sees dentist every 6 months?: No  Feet checked by healthcare provider in the last year?: No    Healthy Coping:  Healthy Coping Assessed Today: Yes  Emotional response to diabetes: Ready to learn  Informal  Support system:: Family, Friends, Spouse  Stage of change: ACTION (Actively working towards change)  Support resources: Websites  Patient Activation Measure Survey Score:       View : No data to display.                Care Plan and Education Provided:  Care Plan: Diabetes   Updates made by Mally Oshea RD since 4/20/2023 12:00 AM      Problem: HbA1C Not In Goal       Goal: Establish Regular Follow-Ups with PCP       Task: Discuss schedule for PCP visits with patient Completed 4/20/2023   Responsible User: Mally Oshea RD      Goal: Get HbA1C Level in Goal       Task: Educate patient on diabetes education self-management topics Completed 4/20/2023   Responsible User: Mally Oshea RD      Problem: Diabetes Self-Management Education Needed to Optimize Self-Care Behaviors       Goal: Monitoring - monitor glucose as directed    This Visit's Progress: 100%   Recent Progress: 0%      Goal: Taking Medication - patient is consistently taking medications as directed    This Visit's Progress: 100%   Recent Progress: 100%      Goal: Problem Solving - know how to prevent and manage short-term diabetes complications       Task: Provide education on low blood glucose - causes, signs/symptoms, prevention, treatment, carrying a carbohydrate source at all times, and medical identification Completed 4/20/2023   Responsible User: Mally Oshea RD      Task: Provide education on how to care for diabetes on sick days Completed 4/20/2023   Responsible User: Mally Oshea RD      Task: Provide education on when to call a health care provider Completed 4/20/2023   Responsible User: Mally Oshea RD      Goal: Reducing Risks - know how to prevent and treat long-term diabetes complications       Task: Provide education on major complications of diabetes, prevention, early diagnostic measures and treatment of complications Completed 4/20/2023   Responsible User: Mally Oshea RD      Task: Provide education on  recommended care for dental, eye and foot health Completed 4/20/2023   Responsible User: Mally Oshea RD      Task: Provide education on Hemoglobin A1c - goals and relationship to blood glucose levels Completed 4/20/2023   Responsible User: Mally Oshea RD      Task: Provide education on recommendations for heart health - lipid levels and goals, blood pressure and goals, and aspirin therapy, if indicated Completed 4/20/2023   Responsible User: Mally Oshea RD        Diabetes self-management training ()  Time Spent: 60 minutes  Encounter Type: Individual    Any diabetes medication dose changes were made via the CDE Protocol per the patient's primary care provider. A copy of this encounter was shared with the provider.

## 2023-04-20 NOTE — LETTER
4/20/2023         RE: Ben Zarate  [address] [place] [facility] [address]        Dear Colleague,    Thank you for referring your patient, Ben Zarate, to the [facility]. Please see a copy of my visit note below.    Diabetes Self-Management Education & Support  Type of Service: In Person Visit    ASSESSMENT:  1 month follow-up for newly diagnosed type 2 diabetes.  Ben is checking his blood sugar twice per day.  All blood sugars are within target.  Patient continues to lose weight.  Instructed on preventing diabetes complications, foot care, sick days and the importance of routine diabetes checks.    Patient's most recent   Lab Results   Component Value Date    A1C 5.0 03/01/2023     is meeting goal of <7.0    Diabetes knowledge and skills assessment:   Patient is knowledgeable in diabetes management concepts related to: Healthy Eating, Being Active, Monitoring, Taking Medication, Problem Solving, Reducing Risks and Healthy Coping    Continue education with the following diabetes management concepts: Problem solving.    Based on learning assessment above, most appropriate setting for further diabetes education would be: Individual setting.      PLAN  See patient instructions.  See Care Plan for co-developed, patient-state behavior change goals.  AVS provided for patient today.    Education Materials Provided:  Preventing diabetes complications  Foot care tips  Sick day management  Symptoms and treatment of high and low blood sugars  American diabetes Association factors that raise and lower blood sugar    SUBJECTIVE/OBJECTIVE:  Presents for: Follow-up  Accompanied by: Self  Diabetes education in the past 24mo: Yes  Focus of Visit: Monitoring  Diabetes type: Type 2  Date of diagnosis: 12/16/22  Disease course: Improving  How confident are you filling out medical forms by yourself:: Extremely  Diabetes management related comments/concerns: none  Transportation concerns:  "No  Difficulty affording diabetes medication?: No  Other concerns:: None  Cultural Influences/Ethnic Background:  Not  or     Diabetes Symptoms & Complications:  Fatigue: Sometimes  Neuropathy: No  Polydipsia: No  Polyphagia: No  Polyuria: No  Visual change: No  Slow healing wounds: No  Symptom course: Improving  Weight trend: Fluctuating  Complications assessed today?: Yes  Autonomic neuropathy: No  CVA: No  Heart disease: No  Nephropathy: No  Peripheral neuropathy: Other  Peripheral Vascular Disease: Other  Retinopathy: Other  Sexual dysfunction: Other    Patient Problem List and Family Medical History reviewed for relevant medical history, current medical status, and diabetes risk factors.    Vitals:  Wt 110.9 kg (244 lb 6.4 oz)   BMI 35.07 kg/m    Estimated body mass index is 35.07 kg/m  as calculated from the following:    Height as of 3/1/23: 1.778 m (5' 10\").    Weight as of this encounter: 110.9 kg (244 lb 6.4 oz).   Last 3 BP:   BP Readings from Last 3 Encounters:   03/01/23 104/70   12/19/22 (!) 130/90   12/16/22 (!) 146/90       History   Smoking Status     Never   Smokeless Tobacco     Never       Labs:  Lab Results   Component Value Date    A1C 5.0 03/01/2023     Lab Results   Component Value Date    GLC 90 03/01/2023    GLC 96 08/28/2014     Lab Results   Component Value Date    LDL 75 12/16/2022     Direct Measure HDL   Date Value Ref Range Status   12/16/2022 32 (L) >=40 mg/dL Final   ]  GFR Estimate   Date Value Ref Range Status   03/01/2023 84 >60 mL/min/1.73m2 Final     Comment:     eGFR calculated using 2021 CKD-EPI equation.   08/28/2014 >90  Non  GFR Calc   >60 mL/min/1.7m2 Final     GFR Estimate If Black   Date Value Ref Range Status   08/28/2014 >90   GFR Calc   >60 mL/min/1.7m2 Final     Lab Results   Component Value Date    CR 1.06 03/01/2023    CR 0.87 08/28/2014     No results found for: MICROALBUMIN    Healthy Eating:  Healthy Eating " Assessed Today: Yes  Cultural/Alevism diet restrictions?: No  Meal planning/habits: Avoiding sweets, Low carb, Low salt, Smaller portions  How many times a week on average do you eat food made away from home (restaurant/take-out)?: 1  Meals include: Breakfast, Lunch  Breakfast: 9:00-9:30 pm Cottage cheese, blackberries, smoked turkey. Diet soda  Lunch: 12:30 pm Salad with chicken, tomatoes, cucumbers and ranch dressing  Dinner: 11:00 PM after work Chicken sandwich on whole wheat bread  Snacks: none  Other: Working 2 jobs. 8:30-5 and 6-11:00 PM  Beverages: Water, Diet soda, Milk  Has patient met with a dietitian in the past?: Yes    Being Active:  Being Active Assessed Today: Yes  Exercise:: Yes (2nd job active from 6-11:00 pm[place]  Days per week of moderate to strenuous exercise (like a brisk walk): 3  On average, minutes per day of exercise at this level: 20  How intense was your typical exercise? : Moderate (like brisk walking)  Exercise Minutes per Week: 60  Barrier to exercise: None    Monitoring:  Monitoring Assessed Today: Yes  Did patient bring glucose meter to appointment? : No  Blood Glucose Meter: Accu-chek  Times checking blood sugar at home (number): 2  Times checking blood sugar at home (per): Day  Blood glucose trend: Decreasing    Taking Medications:  Diabetes Medication(s)     Biguanides       metFORMIN (GLUCOPHAGE XR) 500 MG 24 hr tablet    Take 1 tablet (500 mg) by mouth 2 times daily (with meals)        Taking Medication Assessed Today: Yes  Current Treatments: Diet, Oral Medication (taken by mouth)  Problems taking diabetes medications regularly?: No  Diabetes medication side effects?: No    Problem Solving:  Problem Solving Assessed Today: Yes  Is the patient at risk for hypoglycemia?: No  Is the patient at risk for DKA?: No    Reducing Risks:  Reducing Risks Assessed Today: Yes  Diabetes Risks: Age over 45 years, Family History  CAD Risks: Diabetes Mellitus, Male sex,  Obesity  Has dilated eye exam at least once a year?: Yes  Sees dentist every 6 months?: No  Feet checked by healthcare provider in the last year?: No    Healthy Coping:  Healthy Coping Assessed Today: Yes  Emotional response to diabetes: Ready to learn  Informal Support system:: Family, Friends, Spouse  Stage of change: ACTION (Actively working towards change)  Support resources: [employer]  Patient Activation Measure Survey Score:       View : No data to display.                Care Plan and Education Provided:  Care Plan: Diabetes   Updates made by Mally Oshea RD since 4/20/2023 12:00 AM      Problem: HbA1C Not In Goal       Goal: Establish Regular Follow-Ups with PCP       Task: Discuss schedule for PCP visits with patient Completed 4/20/2023   Responsible User: Mally Oshea RD      Goal: Get HbA1C Level in Goal       Task: Educate patient on diabetes education self-management topics Completed 4/20/2023   Responsible User: Mally Oshea RD      Problem: Diabetes Self-Management Education Needed to Optimize Self-Care Behaviors       Goal: Monitoring - monitor glucose as directed    This Visit's Progress: 100%   Recent Progress: 0%      Goal: Taking Medication - patient is consistently taking medications as directed    This Visit's Progress: 100%   Recent Progress: 100%      Goal: Problem Solving - know how to prevent and manage short-term diabetes complications       Task: Provide education on low blood glucose - causes, signs/symptoms, prevention, treatment, carrying a carbohydrate source at all times, and medical identification Completed 4/20/2023   Responsible User: Mally Oshea RD      Task: Provide education on how to care for diabetes on sick days Completed 4/20/2023   Responsible User: Mally Oshea RD      Task: Provide education on when to call a health care provider Completed 4/20/2023   Responsible User: aMlly Oshea RD      Goal: Reducing Risks - know how to prevent and treat  long-term diabetes complications       Task: Provide education on major complications of diabetes, prevention, early diagnostic measures and treatment of complications Completed 4/20/2023   Responsible User: Mally Oshea RD      Task: Provide education on recommended care for dental, eye and foot health Completed 4/20/2023   Responsible User: Mally Oshea RD      Task: Provide education on Hemoglobin A1c - goals and relationship to blood glucose levels Completed 4/20/2023   Responsible User: Mally Oshea RD      Task: Provide education on recommendations for heart health - lipid levels and goals, blood pressure and goals, and aspirin therapy, if indicated Completed 4/20/2023   Responsible User: Mally Oshea RD        Diabetes self-management training ()  Time Spent: 60 minutes  Encounter Type: Individual    Any diabetes medication dose changes were made via the CDE Protocol per the patient's primary care provider. A copy of this encounter was shared with the provider.

## 2023-07-23 ENCOUNTER — HEALTH MAINTENANCE LETTER (OUTPATIENT)
Age: 53
End: 2023-07-23

## 2023-08-15 DIAGNOSIS — K21.00 GASTROESOPHAGEAL REFLUX DISEASE WITH ESOPHAGITIS WITHOUT HEMORRHAGE: ICD-10-CM

## 2023-08-15 NOTE — TELEPHONE ENCOUNTER
"Last Written Prescription Date:  3/1/23  Last Fill Quantity: 90,  # refills: 1   Last office visit provider:  3/1/23, PCP     Requested Prescriptions   Pending Prescriptions Disp Refills    omeprazole (PRILOSEC) 20 MG DR capsule [Pharmacy Med Name: OMEPRAZOLE DR 20 MG CAPSULE] 90 capsule 1     Sig: TAKE 1 CAPSULE BY MOUTH EVERY MORNING.       PPI Protocol Passed - 8/15/2023 11:32 AM        Passed - Not on Clopidogrel (unless Pantoprazole ordered)        Passed - No diagnosis of osteoporosis on record        Passed - Recent (12 mo) or future (30 days) visit within the authorizing provider's specialty     Patient has had an office visit with the authorizing provider or a provider within the authorizing providers department within the previous 12 mos or has a future within next 30 days. See \"Patient Info\" tab in inbasket, or \"Choose Columns\" in Meds & Orders section of the refill encounter.              Passed - Medication is active on med list        Passed - Patient is age 18 or older             Marisel Andrews RN 08/15/23 2:06 PM      "

## 2023-08-21 ENCOUNTER — TELEPHONE (OUTPATIENT)
Dept: FAMILY MEDICINE | Facility: CLINIC | Age: 53
End: 2023-08-21

## 2023-08-21 NOTE — TELEPHONE ENCOUNTER
Due DM office visit with Dr Dominguez.      Left message to call back for: patient  Information to relay to patient: FYI for Dr Dominguez regarding statin therapy comment from pharmacy.

## 2023-08-21 NOTE — TELEPHONE ENCOUNTER
"Note from pharmacy states:    \"We spoke to your patient about diabetes care and noticed your patient has not had a statin therapy at [facility] pharmacy in the last 180 days.    Your patient would like us to reach out on their behalf to determine if it is best appropriate to start a statin therapy. Please send a new prescription for statin therapy if appropriate.\"  "

## 2023-09-30 DIAGNOSIS — E11.9 NEW ONSET TYPE 2 DIABETES MELLITUS (H): ICD-10-CM

## 2023-10-01 ENCOUNTER — HEALTH MAINTENANCE LETTER (OUTPATIENT)
Age: 53
End: 2023-10-01

## 2023-10-02 ENCOUNTER — TELEPHONE (OUTPATIENT)
Dept: FAMILY MEDICINE | Facility: CLINIC | Age: 53
End: 2023-10-02

## 2023-10-02 RX ORDER — METFORMIN HCL 500 MG
500 TABLET, EXTENDED RELEASE 24 HR ORAL 2 TIMES DAILY WITH MEALS
Qty: 180 TABLET | Refills: 0 | Status: SHIPPED | OUTPATIENT
Start: 2023-10-02 | End: 2024-01-03

## 2023-10-02 NOTE — TELEPHONE ENCOUNTER
Left message to call back for: pt  Information to relay to patient: lm adv pt that he will need to make an appointment with Dr Dominguez before any more refills on medications.

## 2023-12-10 ENCOUNTER — HEALTH MAINTENANCE LETTER (OUTPATIENT)
Age: 53
End: 2023-12-10

## 2023-12-30 DIAGNOSIS — E11.9 NEW ONSET TYPE 2 DIABETES MELLITUS (H): ICD-10-CM

## 2024-01-03 RX ORDER — METFORMIN HCL 500 MG
TABLET, EXTENDED RELEASE 24 HR ORAL
Qty: 180 TABLET | Refills: 0 | Status: SHIPPED | OUTPATIENT
Start: 2024-01-03 | End: 2024-04-03

## 2024-03-15 ENCOUNTER — TRANSFERRED RECORDS (OUTPATIENT)
Dept: MULTI SPECIALTY CLINIC | Facility: CLINIC | Age: 54
End: 2024-03-15

## 2024-03-15 LAB — RETINOPATHY: NORMAL

## 2024-04-03 DIAGNOSIS — E11.9 NEW ONSET TYPE 2 DIABETES MELLITUS (H): ICD-10-CM

## 2024-04-03 RX ORDER — METFORMIN HCL 500 MG
TABLET, EXTENDED RELEASE 24 HR ORAL
Qty: 60 TABLET | Refills: 0 | Status: SHIPPED | OUTPATIENT
Start: 2024-04-03 | End: 2024-05-02

## 2024-04-28 ENCOUNTER — HEALTH MAINTENANCE LETTER (OUTPATIENT)
Age: 54
End: 2024-04-28

## 2024-05-01 DIAGNOSIS — E11.9 NEW ONSET TYPE 2 DIABETES MELLITUS (H): ICD-10-CM

## 2024-05-02 RX ORDER — METFORMIN HCL 500 MG
TABLET, EXTENDED RELEASE 24 HR ORAL
Qty: 60 TABLET | Refills: 0 | Status: SHIPPED | OUTPATIENT
Start: 2024-05-02 | End: 2024-06-03

## 2024-06-01 DIAGNOSIS — E11.9 NEW ONSET TYPE 2 DIABETES MELLITUS (H): ICD-10-CM

## 2024-06-03 RX ORDER — METFORMIN HCL 500 MG
TABLET, EXTENDED RELEASE 24 HR ORAL
Qty: 60 TABLET | Refills: 0 | Status: SHIPPED | OUTPATIENT
Start: 2024-06-03 | End: 2024-06-28

## 2024-06-28 DIAGNOSIS — E11.9 NEW ONSET TYPE 2 DIABETES MELLITUS (H): ICD-10-CM

## 2024-06-28 RX ORDER — METFORMIN HCL 500 MG
TABLET, EXTENDED RELEASE 24 HR ORAL
Qty: 180 TABLET | Refills: 1 | Status: SHIPPED | OUTPATIENT
Start: 2024-06-28

## 2024-09-15 ENCOUNTER — HEALTH MAINTENANCE LETTER (OUTPATIENT)
Age: 54
End: 2024-09-15

## 2024-11-24 ENCOUNTER — HEALTH MAINTENANCE LETTER (OUTPATIENT)
Age: 54
End: 2024-11-24

## 2025-01-11 ENCOUNTER — HEALTH MAINTENANCE LETTER (OUTPATIENT)
Age: 55
End: 2025-01-11

## 2025-01-14 DIAGNOSIS — E11.9 NEW ONSET TYPE 2 DIABETES MELLITUS (H): ICD-10-CM

## 2025-01-15 RX ORDER — METFORMIN HYDROCHLORIDE 500 MG/1
TABLET, EXTENDED RELEASE ORAL
Qty: 180 TABLET | Refills: 0 | Status: SHIPPED | OUTPATIENT
Start: 2025-01-15

## 2025-01-15 NOTE — TELEPHONE ENCOUNTER
Left message to call back for: patient  Information to relay to patient: LMTCB pt well over due for OV in clinic. Last appt in clinic was 3/1/2023. APPT NEEDED. MyChart msg sent.    Vera Burciaga MA on 1/15/2025 at 2:36 PM

## 2025-02-04 ENCOUNTER — OFFICE VISIT (OUTPATIENT)
Dept: FAMILY MEDICINE | Facility: CLINIC | Age: 55
End: 2025-02-04
Payer: COMMERCIAL

## 2025-02-04 VITALS
DIASTOLIC BLOOD PRESSURE: 100 MMHG | HEART RATE: 90 BPM | SYSTOLIC BLOOD PRESSURE: 142 MMHG | TEMPERATURE: 98.8 F | OXYGEN SATURATION: 95 % | RESPIRATION RATE: 14 BRPM | HEIGHT: 69 IN | WEIGHT: 272.9 LBS | BODY MASS INDEX: 40.42 KG/M2

## 2025-02-04 DIAGNOSIS — Z00.00 ROUTINE GENERAL MEDICAL EXAMINATION AT A HEALTH CARE FACILITY: Primary | ICD-10-CM

## 2025-02-04 DIAGNOSIS — B35.1 ONYCHOMYCOSIS: ICD-10-CM

## 2025-02-04 DIAGNOSIS — E66.812 CLASS 2 SEVERE OBESITY DUE TO EXCESS CALORIES WITH SERIOUS COMORBIDITY AND BODY MASS INDEX (BMI) OF 39.0 TO 39.9 IN ADULT (H): ICD-10-CM

## 2025-02-04 DIAGNOSIS — E11.40 TYPE 2 DIABETES MELLITUS WITH DIABETIC NEUROPATHY, WITHOUT LONG-TERM CURRENT USE OF INSULIN (H): ICD-10-CM

## 2025-02-04 DIAGNOSIS — I10 ESSENTIAL HYPERTENSION: ICD-10-CM

## 2025-02-04 DIAGNOSIS — Z12.5 SCREENING FOR PROSTATE CANCER: ICD-10-CM

## 2025-02-04 DIAGNOSIS — E66.01 CLASS 2 SEVERE OBESITY DUE TO EXCESS CALORIES WITH SERIOUS COMORBIDITY AND BODY MASS INDEX (BMI) OF 39.0 TO 39.9 IN ADULT (H): ICD-10-CM

## 2025-02-04 DIAGNOSIS — K21.00 GASTROESOPHAGEAL REFLUX DISEASE WITH ESOPHAGITIS WITHOUT HEMORRHAGE: ICD-10-CM

## 2025-02-04 LAB
ALBUMIN SERPL BCG-MCNC: 4.4 G/DL (ref 3.5–5.2)
ALP SERPL-CCNC: 70 U/L (ref 40–150)
ALT SERPL W P-5'-P-CCNC: 25 U/L (ref 0–70)
ANION GAP SERPL CALCULATED.3IONS-SCNC: 14 MMOL/L (ref 7–15)
AST SERPL W P-5'-P-CCNC: 24 U/L (ref 0–45)
BILIRUB SERPL-MCNC: 0.5 MG/DL
BUN SERPL-MCNC: 21.8 MG/DL (ref 6–20)
CALCIUM SERPL-MCNC: 9.6 MG/DL (ref 8.8–10.4)
CHLORIDE SERPL-SCNC: 104 MMOL/L (ref 98–107)
CHOLEST SERPL-MCNC: 200 MG/DL
CREAT SERPL-MCNC: 1 MG/DL (ref 0.67–1.17)
CREAT UR-MCNC: 183 MG/DL
EGFRCR SERPLBLD CKD-EPI 2021: 89 ML/MIN/1.73M2
EST. AVERAGE GLUCOSE BLD GHB EST-MCNC: 120 MG/DL
FASTING STATUS PATIENT QL REPORTED: YES
FASTING STATUS PATIENT QL REPORTED: YES
GLUCOSE SERPL-MCNC: 129 MG/DL (ref 70–99)
HBA1C MFR BLD: 5.8 % (ref 0–5.6)
HCO3 SERPL-SCNC: 22 MMOL/L (ref 22–29)
HDLC SERPL-MCNC: 35 MG/DL
LDLC SERPL CALC-MCNC: 113 MG/DL
MICROALBUMIN UR-MCNC: 18.2 MG/L
MICROALBUMIN/CREAT UR: 9.95 MG/G CR (ref 0–17)
NONHDLC SERPL-MCNC: 165 MG/DL
POTASSIUM SERPL-SCNC: 4.4 MMOL/L (ref 3.4–5.3)
PROT SERPL-MCNC: 7.7 G/DL (ref 6.4–8.3)
PSA SERPL DL<=0.01 NG/ML-MCNC: 0.72 NG/ML (ref 0–3.5)
SODIUM SERPL-SCNC: 140 MMOL/L (ref 135–145)
TRIGL SERPL-MCNC: 262 MG/DL

## 2025-02-04 PROCEDURE — 83036 HEMOGLOBIN GLYCOSYLATED A1C: CPT

## 2025-02-04 PROCEDURE — G0103 PSA SCREENING: HCPCS

## 2025-02-04 PROCEDURE — 99214 OFFICE O/P EST MOD 30 MIN: CPT | Mod: 25

## 2025-02-04 PROCEDURE — 80061 LIPID PANEL: CPT

## 2025-02-04 PROCEDURE — G2211 COMPLEX E/M VISIT ADD ON: HCPCS

## 2025-02-04 PROCEDURE — 80053 COMPREHEN METABOLIC PANEL: CPT

## 2025-02-04 PROCEDURE — 82570 ASSAY OF URINE CREATININE: CPT

## 2025-02-04 PROCEDURE — 82043 UR ALBUMIN QUANTITATIVE: CPT

## 2025-02-04 PROCEDURE — 99396 PREV VISIT EST AGE 40-64: CPT

## 2025-02-04 PROCEDURE — 36415 COLL VENOUS BLD VENIPUNCTURE: CPT

## 2025-02-04 RX ORDER — OMEPRAZOLE 20 MG/1
20 CAPSULE, DELAYED RELEASE ORAL DAILY
Qty: 90 CAPSULE | Refills: 1 | Status: SHIPPED | OUTPATIENT
Start: 2025-02-04

## 2025-02-04 RX ORDER — LISINOPRIL 10 MG/1
10 TABLET ORAL DAILY
Qty: 30 TABLET | Refills: 0 | Status: SHIPPED | OUTPATIENT
Start: 2025-02-04

## 2025-02-04 RX ORDER — LORATADINE 10 MG/1
10 TABLET ORAL DAILY
COMMUNITY

## 2025-02-04 RX ORDER — TERBINAFINE HYDROCHLORIDE 250 MG/1
250 TABLET ORAL DAILY
Qty: 90 TABLET | Refills: 0 | Status: SHIPPED | OUTPATIENT
Start: 2025-02-04 | End: 2025-05-05

## 2025-02-04 SDOH — HEALTH STABILITY: PHYSICAL HEALTH: ON AVERAGE, HOW MANY DAYS PER WEEK DO YOU ENGAGE IN MODERATE TO STRENUOUS EXERCISE (LIKE A BRISK WALK)?: 5 DAYS

## 2025-02-04 SDOH — HEALTH STABILITY: PHYSICAL HEALTH: ON AVERAGE, HOW MANY MINUTES DO YOU ENGAGE IN EXERCISE AT THIS LEVEL?: 20 MIN

## 2025-02-04 ASSESSMENT — PAIN SCALES - GENERAL: PAINLEVEL_OUTOF10: NO PAIN (0)

## 2025-02-04 ASSESSMENT — SOCIAL DETERMINANTS OF HEALTH (SDOH): HOW OFTEN DO YOU GET TOGETHER WITH FRIENDS OR RELATIVES?: ONCE A WEEK

## 2025-02-04 NOTE — PROGRESS NOTES
Preventive Care Visit  [facility]  Shirin Earl PA-C, Family Medicine  Feb 4, 2025      Assessment & Plan     Routine general medical examination at a health care facility  Patient seen today for annual physical exam.  Routine health maintenance reviewed.   Vaccinations: declined routine vaccination today  Colon cancer screen: UTD  Prostate cancer screen: repeat PSA today  Acute and chronic concerns addressed below.   - REVIEW OF HEALTH MAINTENANCE PROTOCOL ORDERS  - PRIMARY CARE FOLLOW-UP SCHEDULING    Type 2 diabetes mellitus with diabetic neuropathy, without long-term current use of insulin (H)  Type 2 Diabetes  HgA1c today 5.8%   Diabetic medications: Continue metformin 500 mg ER BID  ASCVD Score: 15.2%   ACE/ARB: restarted lisinopril 10 mg once daily today  Statin: not currently taking. Will review with patient upon return of updated lipid panel as I would recommend treating with atorvastatin 40 mg once daily.   ??Lipid panel: updated today  Microalbumin: updated today  Eye exams: LUIS signed for out of network eye clinic  Monofilament exam: updated today  - HEMOGLOBIN A1C  - Lipid panel reflex to direct LDL Non-fasting  - Albumin Random Urine Quantitative with Creat Ratio    Essential hypertension  Uncontrolled  Continue current medications: Restart lisinopril 10 mg once daily   Educated to routinely monitor home blood pressures.  Hold medication if BP <100/60 or feeling dizzy/lightheaded.  Discussed DASH diet and dietary sodium restrictions.  Continue/Increase dietary efforts and physical activity.  Labs checked today: CMP  Follow up in 2 weeks with MA only appointment  - lisinopril (ZESTRIL) 10 MG tablet  Dispense: 30 tablet; Refill: 0  - Comprehensive metabolic panel (BMP + Alb, Alk Phos, ALT, AST, Total. Bili, TP)    Class 2 severe obesity due to excess calories with serious comorbidity and body mass index (BMI) of 39.0 to 39.9 in adult (H)  Comorbidity of T2DM and HTN.  "Encouraged diet and exercise.     Screening for prostate cancer  - PSA, screen    Onychomycosis  Wanting to trial treatment with terbinafine. Will check liver enzymes today. Educated onychomycosis is rather difficult to treat and it is possible he wont see improvement with treatment.   - Comprehensive metabolic panel (BMP + Alb, Alk Phos, ALT, AST, Total. Bili, TP)  - terbinafine (LAMISIL) 250 MG tablet  Dispense: 90 tablet; Refill: 0  - Comprehensive metabolic panel (BMP + Alb, Alk Phos, ALT, AST, Total. Bili, TP)    Gastroesophageal reflux disease with esophagitis without hemorrhage  Stable. Has tried to wean off several times prior but always ends up going back on the medication.   - omeprazole (PRILOSEC) 20 MG DR capsule  Dispense: 90 capsule; Refill: 1      The longitudinal plan of care for the diagnosis(es)/condition(s) as documented were addressed during this visit. Due to the added complexity in care, I will continue to support Ben in the subsequent management and with ongoing continuity of care.      Patient has been advised of split billing requirements and indicates understanding: Yes      BMI  Estimated body mass index is 39.84 kg/m  as calculated from the following:    Height as of this encounter: 1.763 m (5' 9.4\").    Weight as of this encounter: 123.8 kg (272 lb 14.4 oz).   Weight management plan: Discussed healthy diet and exercise guidelines    Counseling  Appropriate preventive services were addressed with this patient via screening, questionnaire, or discussion as appropriate for fall prevention, nutrition, physical activity, Tobacco-use cessation, social engagement, weight loss and cognition.  Checklist reviewing preventive services available has been given to the patient.  Reviewed patient's diet, addressing concerns and/or questions.       Subjective   Ben is a 54 year old, presenting for the following:  Physical (Pt is fasting. Toe nail fungus-chronic-wanting treatment. ) and Establish Care " (Repeat lab work for diabetes. )        2/4/2025     9:50 AM   Additional Questions   Roomed by Ekta PEARSON LPN        Via the Health Maintenance questionnaire, the patient has reported the following services have been completed -Eye Exam: [facility] 2024-03-15, this information has been sent to the abstraction team.    HPI    T2DM: Taking metformin 500 mg BID with food. Reports he had changed lifestyle changes which showed good improvement in his HgA1c. Unforunately had some life events happen and he fell of the wagon for a bit.  Hasn't been checking his blood sugars at home - reports his equipment broke. Eye exams follows with [facility].     HTN: Doesn't test BP at home. Previously taking lisinopril 20 mg once daily but was stopped because BP was low. Suspect BP decreased due to weight loss and healthy lifestyle changes.     GERD: Omeprazole 20 mg once daily. Things are stable. Has tried several times to weaned off of this but always ended up needed to restart it.       Health Care Directive  Patient does not have a Health Care Directive: Discussed advance care planning with patient; however, patient declined at this time.      2/4/2025   General Health   How would you rate your overall physical health? (!) FAIR   Feel stress (tense, anxious, or unable to sleep) Only a little   (!) STRESS CONCERN      2/4/2025   Nutrition   Three or more servings of calcium each day? (!) NO   Diet: Diabetic   How many servings of fruit and vegetables per day? (!) 0-1   How many sweetened beverages each day? 0-1         2/4/2025   Exercise   Days per week of moderate/strenous exercise 5 days   Average minutes spent exercising at this level 20 min         2/4/2025   Social Factors   Frequency of gathering with friends or relatives Once a week   Worry food won't last until get money to buy more No   Food not last or not have enough money for food? No   Do you have housing? (Housing is defined as stable permanent housing and  does not include staying ouside in a car, in a tent, in an abandoned building, in an overnight shelter, or couch-surfing.) Yes   Are you worried about losing your housing? No   Lack of transportation? No   Unable to get utilities (heat,electricity)? No         2/4/2025   Fall Risk   Fallen 2 or more times in the past year? No   Trouble with walking or balance? No          2/4/2025   Dental   Dentist two times every year? Yes         2/4/2025   TB Screening   Were you born outside of the [place]? No         Today's PHQ-2 Score:       2/4/2025     9:45 AM   PHQ-2 ( 1999 [employer])   Q1: Little interest or pleasure in doing things 0   Q2: Feeling down, depressed or hopeless 0   PHQ-2 Score 0    Q1: Little interest or pleasure in doing things Not at all   Q2: Feeling down, depressed or hopeless Not at all   PHQ-2 Score 0       Patient-reported           2/4/2025   Substance Use   Alcohol more than 3/day or more than 7/wk No   Do you use any other substances recreationally? No     Social History     Tobacco Use    Smoking status: Never     Passive exposure: Never    Smokeless tobacco: Never    Tobacco comments:     no vaping   Vaping Use    Vaping status: Never Used   Substance Use Topics    Alcohol use: Yes     Comment: Alcoholic Drinks/day: rare    Drug use: No             2/4/2025   One time HIV Screening   Previous HIV test? Yes         2/4/2025   STI Screening   New sexual partner(s) since last STI/HIV test? No   ASCVD Risk   The 10-year ASCVD risk score (Nadir HERNANDEZ, et al., 2019) is: 15.2%    Values used to calculate the score:      Age: 54 years      Sex: Male      Is Non- : No      Diabetic: Yes      Tobacco smoker: No      Systolic Blood Pressure: 142 mmHg      Is BP treated: Yes      HDL Cholesterol: 32 mg/dL      Total Cholesterol: 160 mg/dL         Reviewed and updated as needed this visit by Provider                    BP Readings from Last 3 Encounters:   02/04/25 (!) 142/100  "  03/01/23 104/70   12/19/22 (!) 130/90    Wt Readings from Last 3 Encounters:   02/04/25 123.8 kg (272 lb 14.4 oz)   04/20/23 110.9 kg (244 lb 6.4 oz)   03/01/23 108.5 kg (239 lb 3.2 oz)             Review of Systems  Constitutional, neuro, ENT, endocrine, pulmonary, cardiac, gastrointestinal, genitourinary, musculoskeletal, integument and psychiatric systems are negative, except as otherwise noted.     Objective    Exam  BP (!) 142/100 (BP Location: Left arm, Patient Position: Sitting, Cuff Size: Adult Large)   Pulse 90   Temp 98.8  F (37.1  C) (Oral)   Resp 14   Ht 1.763 m (5' 9.4\")   Wt 123.8 kg (272 lb 14.4 oz)   SpO2 95%   BMI 39.84 kg/m     Estimated body mass index is 39.84 kg/m  as calculated from the following:    Height as of this encounter: 1.763 m (5' 9.4\").    Weight as of this encounter: 123.8 kg (272 lb 14.4 oz).    Physical Exam  GENERAL: alert and no distress  EYES: Eyes grossly normal to inspection, conjunctivae and sclerae normal  HENT: ear canals and TM's normal, nose and mouth without ulcers or lesions  NECK: no adenopathy, no asymmetry, masses, or scars  RESP: lungs clear to auscultation - no rales, rhonchi or wheezes  CV: regular rate and rhythm, normal S1 S2, no S3 or S4, no murmur, click or rub, no peripheral edema  MS: no gross musculoskeletal defects noted, no edema  SKIN: no suspicious lesions or rashes  NEURO: Normal strength and tone, mentation intact and speech normal  PSYCH: mentation appears normal, affect normal/bright  Diabetic foot exam: normal DP pulses, no trophic changes or ulcerative lesions, normal sensory exam, normal monofilament exam, and onychomycosis on right toes.       Signed Electronically by: Shirin Earl PA-C    "

## 2025-02-04 NOTE — PATIENT INSTRUCTIONS
Goal blood pressure 120/80. Hold medication if <100/60.   Follow up in 2 weeks with an MA only appointment to recheck those BP levels.     Patient Education   Preventive Care Advice   This is general advice given by our system to help you stay healthy. However, your care team may have specific advice just for you. Please talk to your care team about your preventive care needs.  Nutrition  Eat 5 or more servings of fruits and vegetables each day.  Try wheat bread, brown rice and whole grain pasta (instead of white bread, rice, and pasta).  Get enough calcium and vitamin D. Check the label on foods and aim for 100% of the RDA (recommended daily allowance).  Lifestyle  Exercise at least 150 minutes each week  (30 minutes a day, 5 days a week).  Do muscle strengthening activities 2 days a week. These help control your weight and prevent disease.  No smoking.  Wear sunscreen to prevent skin cancer.  Have a dental exam and cleaning every 6 months.  Yearly exams  See your health care team every year to talk about:  Any changes in your health.  Any medicines your care team has prescribed.  Preventive care, family planning, and ways to prevent chronic diseases.  Shots (vaccines)   HPV shots (up to age 26), if you've never had them before.  Hepatitis B shots (up to age 59), if you've never had them before.  COVID-19 shot: Get this shot when it's due.  Flu shot: Get a flu shot every year.  Tetanus shot: Get a tetanus shot every 10 years.  Pneumococcal, hepatitis A, and RSV shots: Ask your care team if you need these based on your risk.  Shingles shot (for age 50 and up)  General health tests  Diabetes screening:  Starting at age 35, Get screened for diabetes at least every 3 years.  If you are younger than age 35, ask your care team if you should be screened for diabetes.  Cholesterol test: At age 39, start having a cholesterol test every 5 years, or more often if advised.  Bone density scan (DEXA): At age 50, ask your care  team if you should have this scan for osteoporosis (brittle bones).  Hepatitis C: Get tested at least once in your life.  STIs (sexually transmitted infections)  Before age 24: Ask your care team if you should be screened for STIs.  After age 24: Get screened for STIs if you're at risk. You are at risk for STIs (including HIV) if:  You are sexually active with more than one person.  You don't use condoms every time.  You or a partner was diagnosed with a sexually transmitted infection.  If you are at risk for HIV, ask about PrEP medicine to prevent HIV.  Get tested for HIV at least once in your life, whether you are at risk for HIV or not.  Cancer screening tests  Cervical cancer screening: If you have a cervix, begin getting regular cervical cancer screening tests starting at age 21.  Breast cancer scan (mammogram): If you've ever had breasts, begin having regular mammograms starting at age 40. This is a scan to check for breast cancer.  Colon cancer screening: It is important to start screening for colon cancer at age 45.  Have a colonoscopy test every 10 years (or more often if you're at risk) Or, ask your provider about stool tests like a FIT test every year or Cologuard test every 3 years.  To learn more about your testing options, visit:   .  For help making a decision, visit:   https://bit.ly/jd97817.  Prostate cancer screening test: If you have a prostate, ask your care team if a prostate cancer screening test (PSA) at age 55 is right for you.  Lung cancer screening: If you are a current or former smoker ages 50 to 80, ask your care team if ongoing lung cancer screenings are right for you.  For informational purposes only. Not to replace the advice of your health care provider. Copyright   2023 [employer] All rights reserved. Clinically reviewed by the [facility]. [employer] [id number] - REV 01/24.

## 2025-02-18 ENCOUNTER — ALLIED HEALTH/NURSE VISIT (OUTPATIENT)
Dept: FAMILY MEDICINE | Facility: CLINIC | Age: 55
End: 2025-02-18
Payer: COMMERCIAL

## 2025-02-18 VITALS
DIASTOLIC BLOOD PRESSURE: 84 MMHG | SYSTOLIC BLOOD PRESSURE: 124 MMHG | RESPIRATION RATE: 14 BRPM | HEART RATE: 89 BPM | OXYGEN SATURATION: 98 % | WEIGHT: 271.2 LBS | BODY MASS INDEX: 40.17 KG/M2 | HEIGHT: 69 IN

## 2025-02-18 DIAGNOSIS — I10 ESSENTIAL HYPERTENSION: ICD-10-CM

## 2025-02-18 DIAGNOSIS — Z01.30 BP CHECK: Primary | ICD-10-CM

## 2025-02-18 PROCEDURE — 99207 PR NO CHARGE NURSE ONLY: CPT

## 2025-02-18 RX ORDER — LISINOPRIL 10 MG/1
10 TABLET ORAL DAILY
Qty: 90 TABLET | Refills: 3 | Status: SHIPPED | OUTPATIENT
Start: 2025-02-18

## 2025-02-18 ASSESSMENT — PAIN SCALES - GENERAL: PAINLEVEL_OUTOF10: NO PAIN (0)

## 2025-02-18 NOTE — PROGRESS NOTES
"Ben Zarate is a 54 year old patient who comes in today for a Blood Pressure check.  Initial BP:  /84 (BP Location: Left arm, Patient Position: Sitting, Cuff Size: Adult Large)   Pulse 89   Resp 14   Ht 1.763 m (5' 9.4\")   Wt 123 kg (271 lb 3.2 oz)   SpO2 98%   BMI 39.59 kg/m       89  Disposition: results routed to provider    Pt prefers [employer] message for communications.   "

## 2025-02-18 NOTE — PROGRESS NOTES
Let patient know that is much better control. We will not make any changes to his lisinopril. I have sent a refill to the pharmacy for you.

## 2025-02-19 ENCOUNTER — TELEPHONE (OUTPATIENT)
Dept: FAMILY MEDICINE | Facility: CLINIC | Age: 55
End: 2025-02-19
Payer: COMMERCIAL

## 2025-02-19 NOTE — TELEPHONE ENCOUNTER
Patient Quality Outreach    Patient is due for the following:   Diabetes -  Statin    Action(s) Taken:   No follow up needed at this time.    Type of outreach:    Chart review performed, no outreach needed.    Questions for provider review:    None           Patricia Saha CMA

## 2025-03-27 ENCOUNTER — TELEPHONE (OUTPATIENT)
Dept: FAMILY MEDICINE | Facility: CLINIC | Age: 55
End: 2025-03-27
Payer: COMMERCIAL

## 2025-03-27 NOTE — TELEPHONE ENCOUNTER
Patient Quality Outreach    Patient is due for the following:   Diabetes -  A1C and Eye Exam      Topic Date Due    Pneumococcal Vaccine (1 of 2 - PCV) Never done    Hepatitis B Vaccine (1 of 3 - 19+ 3-dose series) Never done    Zoster (Shingles) Vaccine (1 of 2) Never done    Flu Vaccine (1) 09/01/2024    COVID-19 Vaccine (3 - 2024-25 season) 09/01/2024       Action(s) Taken:   No follow up needed at this time.  Request for diabetic eye exam sent to pt.     Type of outreach:    Sent [employer] message.    Questions for provider review:    None         Ekta Preciado LPN  Chart routed to NA.

## 2025-04-12 DIAGNOSIS — E11.9 NEW ONSET TYPE 2 DIABETES MELLITUS (H): ICD-10-CM

## 2025-04-14 RX ORDER — METFORMIN HYDROCHLORIDE 500 MG/1
TABLET, EXTENDED RELEASE ORAL
Qty: 180 TABLET | Refills: 2 | Status: SHIPPED | OUTPATIENT
Start: 2025-04-14

## 2025-05-17 ENCOUNTER — HEALTH MAINTENANCE LETTER (OUTPATIENT)
Age: 55
End: 2025-05-17

## 2025-06-04 ENCOUNTER — TELEPHONE (OUTPATIENT)
Dept: FAMILY MEDICINE | Facility: CLINIC | Age: 55
End: 2025-06-04
Payer: COMMERCIAL

## 2025-06-04 NOTE — TELEPHONE ENCOUNTER
Patient Quality Outreach    Patient is due for the following:   Diabetes -  A1C and Eye Exam      Topic Date Due    Pneumococcal Vaccine (1 of 2 - PCV) Never done    Hepatitis B Vaccine (1 of 3 - 19+ 3-dose series) Never done    Zoster (Shingles) Vaccine (1 of 2) Never done    COVID-19 Vaccine (3 - 2024-25 season) 09/01/2024       Action(s) Taken:   No follow up needed at this time.  Outreach to get diabetic eye exam results sent to PCP.     Type of outreach:    Sent [employer] message.    Questions for provider review:    None         Ekta Preciado LPN  Chart routed to None.

## 2025-08-02 DIAGNOSIS — K21.00 GASTROESOPHAGEAL REFLUX DISEASE WITH ESOPHAGITIS WITHOUT HEMORRHAGE: ICD-10-CM

## 2025-08-04 RX ORDER — OMEPRAZOLE 20 MG/1
20 CAPSULE, DELAYED RELEASE ORAL DAILY
Qty: 90 CAPSULE | Refills: 1 | Status: SHIPPED | OUTPATIENT
Start: 2025-08-04

## 2025-08-30 ENCOUNTER — HEALTH MAINTENANCE LETTER (OUTPATIENT)
Age: 55
End: 2025-08-30